# Patient Record
Sex: MALE | Race: WHITE | NOT HISPANIC OR LATINO
[De-identification: names, ages, dates, MRNs, and addresses within clinical notes are randomized per-mention and may not be internally consistent; named-entity substitution may affect disease eponyms.]

---

## 2017-06-17 PROBLEM — Z00.00 ENCOUNTER FOR PREVENTIVE HEALTH EXAMINATION: Status: ACTIVE | Noted: 2017-06-17

## 2017-06-28 ENCOUNTER — APPOINTMENT (OUTPATIENT)
Dept: UROLOGY | Facility: CLINIC | Age: 57
End: 2017-06-28

## 2017-06-28 VITALS
DIASTOLIC BLOOD PRESSURE: 82 MMHG | HEART RATE: 67 BPM | BODY MASS INDEX: 22.76 KG/M2 | RESPIRATION RATE: 17 BRPM | WEIGHT: 159 LBS | HEIGHT: 70 IN | SYSTOLIC BLOOD PRESSURE: 135 MMHG | TEMPERATURE: 97.7 F

## 2017-06-29 LAB
APPEARANCE: CLEAR
BACTERIA: NEGATIVE
BILIRUBIN URINE: NEGATIVE
BLOOD URINE: ABNORMAL
COLOR: YELLOW
GLUCOSE QUALITATIVE U: NORMAL MG/DL
HYALINE CASTS: 2 /LPF
KETONES URINE: NEGATIVE
LEUKOCYTE ESTERASE URINE: NEGATIVE
MICROSCOPIC-UA: NORMAL
NITRITE URINE: NEGATIVE
PH URINE: 6
PROTEIN URINE: NEGATIVE MG/DL
RED BLOOD CELLS URINE: 2 /HPF
SPECIFIC GRAVITY URINE: 1.01
SQUAMOUS EPITHELIAL CELLS: 0 /HPF
UROBILINOGEN URINE: NORMAL MG/DL
WHITE BLOOD CELLS URINE: 1 /HPF

## 2017-07-06 ENCOUNTER — TRANSCRIPTION ENCOUNTER (OUTPATIENT)
Age: 57
End: 2017-07-06

## 2017-07-10 LAB — BACTERIA UR CULT: NORMAL

## 2017-08-01 ENCOUNTER — OUTPATIENT (OUTPATIENT)
Dept: OUTPATIENT SERVICES | Facility: HOSPITAL | Age: 57
LOS: 1 days | End: 2017-08-01
Payer: COMMERCIAL

## 2017-08-01 ENCOUNTER — MESSAGE (OUTPATIENT)
Age: 57
End: 2017-08-01

## 2017-08-01 VITALS
WEIGHT: 158.73 LBS | HEART RATE: 66 BPM | SYSTOLIC BLOOD PRESSURE: 120 MMHG | RESPIRATION RATE: 16 BRPM | HEIGHT: 71 IN | DIASTOLIC BLOOD PRESSURE: 667 MMHG | OXYGEN SATURATION: 99 % | TEMPERATURE: 98 F

## 2017-08-01 DIAGNOSIS — Z98.890 OTHER SPECIFIED POSTPROCEDURAL STATES: Chronic | ICD-10-CM

## 2017-08-01 DIAGNOSIS — N20.0 CALCULUS OF KIDNEY: ICD-10-CM

## 2017-08-01 DIAGNOSIS — Z01.818 ENCOUNTER FOR OTHER PREPROCEDURAL EXAMINATION: ICD-10-CM

## 2017-08-01 LAB
ANION GAP SERPL CALC-SCNC: 14 MMOL/L — SIGNIFICANT CHANGE UP (ref 5–17)
BLD GP AB SCN SERPL QL: NEGATIVE — SIGNIFICANT CHANGE UP
BUN SERPL-MCNC: 21 MG/DL — SIGNIFICANT CHANGE UP (ref 7–23)
CALCIUM SERPL-MCNC: 9.6 MG/DL — SIGNIFICANT CHANGE UP (ref 8.4–10.5)
CHLORIDE SERPL-SCNC: 104 MMOL/L — SIGNIFICANT CHANGE UP (ref 96–108)
CO2 SERPL-SCNC: 23 MMOL/L — SIGNIFICANT CHANGE UP (ref 22–31)
CREAT SERPL-MCNC: 1.09 MG/DL — SIGNIFICANT CHANGE UP (ref 0.5–1.3)
GLUCOSE SERPL-MCNC: 108 MG/DL — HIGH (ref 70–99)
HCT VFR BLD CALC: 44.4 % — SIGNIFICANT CHANGE UP (ref 39–50)
HGB BLD-MCNC: 15.1 G/DL — SIGNIFICANT CHANGE UP (ref 13–17)
MCHC RBC-ENTMCNC: 29 PG — SIGNIFICANT CHANGE UP (ref 27–34)
MCHC RBC-ENTMCNC: 34 GM/DL — SIGNIFICANT CHANGE UP (ref 32–36)
MCV RBC AUTO: 85.4 FL — SIGNIFICANT CHANGE UP (ref 80–100)
PLATELET # BLD AUTO: 200 K/UL — SIGNIFICANT CHANGE UP (ref 150–400)
POTASSIUM SERPL-MCNC: 3.7 MMOL/L — SIGNIFICANT CHANGE UP (ref 3.5–5.3)
POTASSIUM SERPL-SCNC: 3.7 MMOL/L — SIGNIFICANT CHANGE UP (ref 3.5–5.3)
RBC # BLD: 5.2 M/UL — SIGNIFICANT CHANGE UP (ref 4.2–5.8)
RBC # FLD: 13.1 % — SIGNIFICANT CHANGE UP (ref 10.3–14.5)
RH IG SCN BLD-IMP: POSITIVE — SIGNIFICANT CHANGE UP
SODIUM SERPL-SCNC: 141 MMOL/L — SIGNIFICANT CHANGE UP (ref 135–145)
WBC # BLD: 5.16 K/UL — SIGNIFICANT CHANGE UP (ref 3.8–10.5)
WBC # FLD AUTO: 5.16 K/UL — SIGNIFICANT CHANGE UP (ref 3.8–10.5)

## 2017-08-01 RX ORDER — SODIUM CHLORIDE 9 MG/ML
3 INJECTION INTRAMUSCULAR; INTRAVENOUS; SUBCUTANEOUS EVERY 8 HOURS
Qty: 0 | Refills: 0 | Status: DISCONTINUED | OUTPATIENT
Start: 2017-08-10 | End: 2017-08-11

## 2017-08-01 RX ORDER — CEFAZOLIN SODIUM 1 G
2000 VIAL (EA) INJECTION ONCE
Qty: 0 | Refills: 0 | Status: DISCONTINUED | OUTPATIENT
Start: 2017-08-10 | End: 2017-08-11

## 2017-08-01 RX ORDER — LIDOCAINE HCL 20 MG/ML
0.2 VIAL (ML) INJECTION ONCE
Qty: 0 | Refills: 0 | Status: DISCONTINUED | OUTPATIENT
Start: 2017-08-10 | End: 2017-08-11

## 2017-08-01 NOTE — H&P PST ADULT - NSANTHOSAYNRD_GEN_A_CORE
No. MAYUR screening performed.  STOP BANG Legend: 0-2 = LOW Risk; 3-4 = INTERMEDIATE Risk; 5-8 = HIGH Risk

## 2017-08-01 NOTE — H&P PST ADULT - PMH
BPH (benign prostatic hyperplasia)    MVP (mitral valve prolapse)  "mild leaky valve i was told"  Renal stones  2015 & now

## 2017-08-01 NOTE — H&P PST ADULT - HISTORY OF PRESENT ILLNESS
56 year old male retired [place]  with h/o renal calculi couple of year ago, reports having" microscopic hematuria due to right kidney stone", scheduled for surgery.

## 2017-08-02 LAB
CULTURE RESULTS: NO GROWTH — SIGNIFICANT CHANGE UP
SPECIMEN SOURCE: SIGNIFICANT CHANGE UP

## 2017-08-10 ENCOUNTER — TRANSCRIPTION ENCOUNTER (OUTPATIENT)
Age: 57
End: 2017-08-10

## 2017-08-10 ENCOUNTER — RESULT REVIEW (OUTPATIENT)
Age: 57
End: 2017-08-10

## 2017-08-10 ENCOUNTER — INPATIENT (INPATIENT)
Facility: HOSPITAL | Age: 57
LOS: 0 days | Discharge: HOME | DRG: 660 | End: 2017-08-11
Attending: UROLOGY | Admitting: UROLOGY
Payer: COMMERCIAL

## 2017-08-10 ENCOUNTER — MESSAGE (OUTPATIENT)
Age: 57
End: 2017-08-10

## 2017-08-10 ENCOUNTER — APPOINTMENT (OUTPATIENT)
Dept: UROLOGY | Facility: HOSPITAL | Age: 57
End: 2017-08-10

## 2017-08-10 VITALS
HEART RATE: 81 BPM | DIASTOLIC BLOOD PRESSURE: 88 MMHG | SYSTOLIC BLOOD PRESSURE: 136 MMHG | HEIGHT: 71 IN | WEIGHT: 160.06 LBS | RESPIRATION RATE: 16 BRPM | TEMPERATURE: 98 F | OXYGEN SATURATION: 100 %

## 2017-08-10 DIAGNOSIS — Z01.818 ENCOUNTER FOR OTHER PREPROCEDURAL EXAMINATION: ICD-10-CM

## 2017-08-10 DIAGNOSIS — N20.0 CALCULUS OF KIDNEY: ICD-10-CM

## 2017-08-10 DIAGNOSIS — Z98.890 OTHER SPECIFIED POSTPROCEDURAL STATES: Chronic | ICD-10-CM

## 2017-08-10 LAB
ANION GAP SERPL CALC-SCNC: 15 MMOL/L — SIGNIFICANT CHANGE UP (ref 5–17)
BUN SERPL-MCNC: 19 MG/DL — SIGNIFICANT CHANGE UP (ref 7–23)
CALCIUM SERPL-MCNC: 8.6 MG/DL — SIGNIFICANT CHANGE UP (ref 8.4–10.5)
CHLORIDE SERPL-SCNC: 102 MMOL/L — SIGNIFICANT CHANGE UP (ref 96–108)
CO2 SERPL-SCNC: 25 MMOL/L — SIGNIFICANT CHANGE UP (ref 22–31)
CREAT SERPL-MCNC: 1.13 MG/DL — SIGNIFICANT CHANGE UP (ref 0.5–1.3)
GLUCOSE SERPL-MCNC: 105 MG/DL — HIGH (ref 70–99)
HCT VFR BLD CALC: 44.8 % — SIGNIFICANT CHANGE UP (ref 39–50)
HGB BLD-MCNC: 14.9 G/DL — SIGNIFICANT CHANGE UP (ref 13–17)
MCHC RBC-ENTMCNC: 29.8 PG — SIGNIFICANT CHANGE UP (ref 27–34)
MCHC RBC-ENTMCNC: 33.3 GM/DL — SIGNIFICANT CHANGE UP (ref 32–36)
MCV RBC AUTO: 89.5 FL — SIGNIFICANT CHANGE UP (ref 80–100)
PLATELET # BLD AUTO: 209 K/UL — SIGNIFICANT CHANGE UP (ref 150–400)
POTASSIUM SERPL-MCNC: 4 MMOL/L — SIGNIFICANT CHANGE UP (ref 3.5–5.3)
POTASSIUM SERPL-SCNC: 4 MMOL/L — SIGNIFICANT CHANGE UP (ref 3.5–5.3)
RBC # BLD: 5.01 M/UL — SIGNIFICANT CHANGE UP (ref 4.2–5.8)
RBC # FLD: 11.9 % — SIGNIFICANT CHANGE UP (ref 10.3–14.5)
SODIUM SERPL-SCNC: 142 MMOL/L — SIGNIFICANT CHANGE UP (ref 135–145)
WBC # BLD: 13.5 K/UL — HIGH (ref 3.8–10.5)
WBC # FLD AUTO: 13.5 K/UL — HIGH (ref 3.8–10.5)

## 2017-08-10 PROCEDURE — 85027 COMPLETE CBC AUTOMATED: CPT

## 2017-08-10 PROCEDURE — 86901 BLOOD TYPING SEROLOGIC RH(D): CPT

## 2017-08-10 PROCEDURE — 86900 BLOOD TYPING SEROLOGIC ABO: CPT

## 2017-08-10 PROCEDURE — 80048 BASIC METABOLIC PNL TOTAL CA: CPT

## 2017-08-10 PROCEDURE — 86850 RBC ANTIBODY SCREEN: CPT

## 2017-08-10 PROCEDURE — G0463: CPT

## 2017-08-10 PROCEDURE — 87086 URINE CULTURE/COLONY COUNT: CPT

## 2017-08-10 PROCEDURE — 88300 SURGICAL PATH GROSS: CPT | Mod: 26

## 2017-08-10 PROCEDURE — 76000 FLUOROSCOPY <1 HR PHYS/QHP: CPT

## 2017-08-10 RX ORDER — OXYCODONE AND ACETAMINOPHEN 5; 325 MG/1; MG/1
2 TABLET ORAL EVERY 6 HOURS
Qty: 0 | Refills: 0 | Status: DISCONTINUED | OUTPATIENT
Start: 2017-08-10 | End: 2017-08-11

## 2017-08-10 RX ORDER — OXYCODONE AND ACETAMINOPHEN 5; 325 MG/1; MG/1
1 TABLET ORAL EVERY 4 HOURS
Qty: 0 | Refills: 0 | Status: DISCONTINUED | OUTPATIENT
Start: 2017-08-10 | End: 2017-08-11

## 2017-08-10 RX ORDER — HEPARIN SODIUM 5000 [USP'U]/ML
5000 INJECTION INTRAVENOUS; SUBCUTANEOUS EVERY 8 HOURS
Qty: 0 | Refills: 0 | Status: DISCONTINUED | OUTPATIENT
Start: 2017-08-10 | End: 2017-08-11

## 2017-08-10 RX ORDER — ONDANSETRON 8 MG/1
4 TABLET, FILM COATED ORAL ONCE
Qty: 0 | Refills: 0 | Status: DISCONTINUED | OUTPATIENT
Start: 2017-08-10 | End: 2017-08-11

## 2017-08-10 RX ORDER — ACETAMINOPHEN 500 MG
650 TABLET ORAL EVERY 6 HOURS
Qty: 0 | Refills: 0 | Status: DISCONTINUED | OUTPATIENT
Start: 2017-08-10 | End: 2017-08-11

## 2017-08-10 RX ORDER — HYDROMORPHONE HYDROCHLORIDE 2 MG/ML
0.5 INJECTION INTRAMUSCULAR; INTRAVENOUS; SUBCUTANEOUS
Qty: 0 | Refills: 0 | Status: DISCONTINUED | OUTPATIENT
Start: 2017-08-10 | End: 2017-08-11

## 2017-08-10 RX ORDER — SODIUM CHLORIDE 9 MG/ML
1000 INJECTION, SOLUTION INTRAVENOUS
Qty: 0 | Refills: 0 | Status: DISCONTINUED | OUTPATIENT
Start: 2017-08-10 | End: 2017-08-11

## 2017-08-10 RX ADMIN — HYDROMORPHONE HYDROCHLORIDE 0.5 MILLIGRAM(S): 2 INJECTION INTRAMUSCULAR; INTRAVENOUS; SUBCUTANEOUS at 23:20

## 2017-08-10 RX ADMIN — HEPARIN SODIUM 5000 UNIT(S): 5000 INJECTION INTRAVENOUS; SUBCUTANEOUS at 22:47

## 2017-08-10 RX ADMIN — HYDROMORPHONE HYDROCHLORIDE 0.5 MILLIGRAM(S): 2 INJECTION INTRAMUSCULAR; INTRAVENOUS; SUBCUTANEOUS at 23:30

## 2017-08-10 RX ADMIN — SODIUM CHLORIDE 3 MILLILITER(S): 9 INJECTION INTRAMUSCULAR; INTRAVENOUS; SUBCUTANEOUS at 22:47

## 2017-08-11 ENCOUNTER — TRANSCRIPTION ENCOUNTER (OUTPATIENT)
Age: 57
End: 2017-08-11

## 2017-08-11 VITALS
HEART RATE: 74 BPM | SYSTOLIC BLOOD PRESSURE: 128 MMHG | OXYGEN SATURATION: 94 % | RESPIRATION RATE: 16 BRPM | DIASTOLIC BLOOD PRESSURE: 71 MMHG | TEMPERATURE: 99 F

## 2017-08-11 DIAGNOSIS — N20.0 CALCULUS OF KIDNEY: ICD-10-CM

## 2017-08-11 LAB
ANION GAP SERPL CALC-SCNC: 17 MMOL/L — SIGNIFICANT CHANGE UP (ref 5–17)
BASOPHILS # BLD AUTO: 0 K/UL — SIGNIFICANT CHANGE UP (ref 0–0.2)
BASOPHILS NFR BLD AUTO: 0 % — SIGNIFICANT CHANGE UP (ref 0–2)
BUN SERPL-MCNC: 21 MG/DL — SIGNIFICANT CHANGE UP (ref 7–23)
CALCIUM SERPL-MCNC: 8.3 MG/DL — LOW (ref 8.4–10.5)
CHLORIDE SERPL-SCNC: 102 MMOL/L — SIGNIFICANT CHANGE UP (ref 96–108)
CO2 SERPL-SCNC: 22 MMOL/L — SIGNIFICANT CHANGE UP (ref 22–31)
CREAT SERPL-MCNC: 1.14 MG/DL — SIGNIFICANT CHANGE UP (ref 0.5–1.3)
EOSINOPHIL # BLD AUTO: 0 K/UL — SIGNIFICANT CHANGE UP (ref 0–0.5)
EOSINOPHIL NFR BLD AUTO: 0.2 % — SIGNIFICANT CHANGE UP (ref 0–6)
GLUCOSE SERPL-MCNC: 133 MG/DL — HIGH (ref 70–99)
HCT VFR BLD CALC: 41.8 % — SIGNIFICANT CHANGE UP (ref 39–50)
HGB BLD-MCNC: 14.1 G/DL — SIGNIFICANT CHANGE UP (ref 13–17)
LYMPHOCYTES # BLD AUTO: 0.6 K/UL — LOW (ref 1–3.3)
LYMPHOCYTES # BLD AUTO: 4.3 % — LOW (ref 13–44)
MCHC RBC-ENTMCNC: 30.1 PG — SIGNIFICANT CHANGE UP (ref 27–34)
MCHC RBC-ENTMCNC: 33.7 GM/DL — SIGNIFICANT CHANGE UP (ref 32–36)
MCV RBC AUTO: 89.3 FL — SIGNIFICANT CHANGE UP (ref 80–100)
MONOCYTES # BLD AUTO: 0.2 K/UL — SIGNIFICANT CHANGE UP (ref 0–0.9)
MONOCYTES NFR BLD AUTO: 1.4 % — LOW (ref 2–14)
NEUTROPHILS # BLD AUTO: 14.2 K/UL — HIGH (ref 1.8–7.4)
NEUTROPHILS NFR BLD AUTO: 94.2 % — HIGH (ref 43–77)
PLATELET # BLD AUTO: 182 K/UL — SIGNIFICANT CHANGE UP (ref 150–400)
POTASSIUM SERPL-MCNC: 4.2 MMOL/L — SIGNIFICANT CHANGE UP (ref 3.5–5.3)
POTASSIUM SERPL-SCNC: 4.2 MMOL/L — SIGNIFICANT CHANGE UP (ref 3.5–5.3)
RBC # BLD: 4.69 M/UL — SIGNIFICANT CHANGE UP (ref 4.2–5.8)
RBC # FLD: 11.9 % — SIGNIFICANT CHANGE UP (ref 10.3–14.5)
SODIUM SERPL-SCNC: 141 MMOL/L — SIGNIFICANT CHANGE UP (ref 135–145)
WBC # BLD: 15.1 K/UL — HIGH (ref 3.8–10.5)
WBC # FLD AUTO: 15.1 K/UL — HIGH (ref 3.8–10.5)

## 2017-08-11 PROCEDURE — 87086 URINE CULTURE/COLONY COUNT: CPT

## 2017-08-11 PROCEDURE — C1726: CPT

## 2017-08-11 PROCEDURE — C1729: CPT

## 2017-08-11 PROCEDURE — 80048 BASIC METABOLIC PNL TOTAL CA: CPT

## 2017-08-11 PROCEDURE — 82365 CALCULUS SPECTROSCOPY: CPT

## 2017-08-11 PROCEDURE — 74176 CT ABD & PELVIS W/O CONTRAST: CPT | Mod: 26

## 2017-08-11 PROCEDURE — 74176 CT ABD & PELVIS W/O CONTRAST: CPT

## 2017-08-11 PROCEDURE — 87070 CULTURE OTHR SPECIMN AEROBIC: CPT

## 2017-08-11 PROCEDURE — C1769: CPT

## 2017-08-11 PROCEDURE — 85027 COMPLETE CBC AUTOMATED: CPT

## 2017-08-11 PROCEDURE — 88300 SURGICAL PATH GROSS: CPT

## 2017-08-11 RX ORDER — OXYCODONE HYDROCHLORIDE 5 MG/1
1 TABLET ORAL
Qty: 18 | Refills: 0 | OUTPATIENT
Start: 2017-08-11 | End: 2017-08-14

## 2017-08-11 RX ORDER — SODIUM CHLORIDE 9 MG/ML
1000 INJECTION INTRAMUSCULAR; INTRAVENOUS; SUBCUTANEOUS ONCE
Qty: 0 | Refills: 0 | Status: COMPLETED | OUTPATIENT
Start: 2017-08-11 | End: 2017-08-11

## 2017-08-11 RX ORDER — OXYCODONE HYDROCHLORIDE 5 MG/1
10 TABLET ORAL ONCE
Qty: 0 | Refills: 0 | Status: DISCONTINUED | OUTPATIENT
Start: 2017-08-11 | End: 2017-08-11

## 2017-08-11 RX ORDER — OXYCODONE HYDROCHLORIDE 5 MG/1
1 TABLET ORAL
Qty: 28 | Refills: 0 | OUTPATIENT
Start: 2017-08-11 | End: 2017-08-18

## 2017-08-11 RX ORDER — CEFAZOLIN SODIUM 1 G
1000 VIAL (EA) INJECTION EVERY 8 HOURS
Qty: 0 | Refills: 0 | Status: DISCONTINUED | OUTPATIENT
Start: 2017-08-11 | End: 2017-08-11

## 2017-08-11 RX ADMIN — HYDROMORPHONE HYDROCHLORIDE 0.5 MILLIGRAM(S): 2 INJECTION INTRAMUSCULAR; INTRAVENOUS; SUBCUTANEOUS at 05:44

## 2017-08-11 RX ADMIN — OXYCODONE AND ACETAMINOPHEN 2 TABLET(S): 5; 325 TABLET ORAL at 09:16

## 2017-08-11 RX ADMIN — Medication 100 MILLIGRAM(S): at 17:22

## 2017-08-11 RX ADMIN — Medication 650 MILLIGRAM(S): at 14:40

## 2017-08-11 RX ADMIN — HYDROMORPHONE HYDROCHLORIDE 0.5 MILLIGRAM(S): 2 INJECTION INTRAMUSCULAR; INTRAVENOUS; SUBCUTANEOUS at 02:05

## 2017-08-11 RX ADMIN — Medication 650 MILLIGRAM(S): at 13:44

## 2017-08-11 RX ADMIN — OXYCODONE HYDROCHLORIDE 10 MILLIGRAM(S): 5 TABLET ORAL at 16:50

## 2017-08-11 RX ADMIN — HYDROMORPHONE HYDROCHLORIDE 0.5 MILLIGRAM(S): 2 INJECTION INTRAMUSCULAR; INTRAVENOUS; SUBCUTANEOUS at 06:00

## 2017-08-11 RX ADMIN — SODIUM CHLORIDE 1000 MILLILITER(S): 9 INJECTION INTRAMUSCULAR; INTRAVENOUS; SUBCUTANEOUS at 02:34

## 2017-08-11 RX ADMIN — OXYCODONE HYDROCHLORIDE 10 MILLIGRAM(S): 5 TABLET ORAL at 15:54

## 2017-08-11 RX ADMIN — Medication 100 MILLIGRAM(S): at 07:56

## 2017-08-11 RX ADMIN — HEPARIN SODIUM 5000 UNIT(S): 5000 INJECTION INTRAVENOUS; SUBCUTANEOUS at 05:14

## 2017-08-11 RX ADMIN — OXYCODONE AND ACETAMINOPHEN 2 TABLET(S): 5; 325 TABLET ORAL at 10:10

## 2017-08-11 RX ADMIN — OXYCODONE AND ACETAMINOPHEN 2 TABLET(S): 5; 325 TABLET ORAL at 19:39

## 2017-08-11 RX ADMIN — HYDROMORPHONE HYDROCHLORIDE 0.5 MILLIGRAM(S): 2 INJECTION INTRAMUSCULAR; INTRAVENOUS; SUBCUTANEOUS at 02:20

## 2017-08-11 RX ADMIN — SODIUM CHLORIDE 3 MILLILITER(S): 9 INJECTION INTRAMUSCULAR; INTRAVENOUS; SUBCUTANEOUS at 13:40

## 2017-08-11 RX ADMIN — HEPARIN SODIUM 5000 UNIT(S): 5000 INJECTION INTRAVENOUS; SUBCUTANEOUS at 13:44

## 2017-08-11 RX ADMIN — SODIUM CHLORIDE 125 MILLILITER(S): 9 INJECTION, SOLUTION INTRAVENOUS at 07:33

## 2017-08-11 RX ADMIN — SODIUM CHLORIDE 3 MILLILITER(S): 9 INJECTION INTRAMUSCULAR; INTRAVENOUS; SUBCUTANEOUS at 05:13

## 2017-08-11 NOTE — PROGRESS NOTE ADULT - SUBJECTIVE AND OBJECTIVE BOX
Post op Check    Pt seen and examined without complaints. Pain is controlled. Denies SOB/CP/N/V.     Vital Signs Last 24 Hrs  T(C): 36.2 (10 Aug 2017 23:15), Max: 36.8 (10 Aug 2017 15:01)  T(F): 97.2 (10 Aug 2017 23:15), Max: 98.2 (10 Aug 2017 15:01)  HR: 57 (11 Aug 2017 00:00) (54 - 81)  BP: 121/64 (11 Aug 2017 00:00) (119/63 - 149/79)  BP(mean): 87 (11 Aug 2017 00:00) (85 - 93)  RR: 15 (11 Aug 2017 00:00) (14 - 19)  SpO2: 98% (11 Aug 2017 00:00) (97% - 100%)    I&O's Summary    10 Aug 2017 07:01  -  11 Aug 2017 01:57  --------------------------------------------------------  IN: 375 mL / OUT: 400 mL / NET: -25 mL        Physical Exam  Gen: NAD  Abd: Soft, NT, ND  Back: R PCN in place, dressing reinforced, tube clamped  : henley in place, urine hematuric                          14.9   13.5  )-----------( 209      ( 10 Aug 2017 22:49 )             44.8       08-10    142  |  102  |  19  ----------------------------<  105<H>  4.0   |  25  |  1.13    Ca    8.6      10 Aug 2017 22:49        A/P: 56y Male s/p R PCNL    -DVT prophylaxis/OOB  -Incentive spirometry  -Strict I&O's  -Analgesia and antiemetics as needed  -Diet  -AM labs  Post op Check    Pt seen and examined without complaints. Pain is controlled. Denies SOB/CP/N/V.     Vital Signs Last 24 Hrs  T(C): 36.2 (10 Aug 2017 23:15), Max: 36.8 (10 Aug 2017 15:01)  T(F): 97.2 (10 Aug 2017 23:15), Max: 98.2 (10 Aug 2017 15:01)  HR: 57 (11 Aug 2017 00:00) (54 - 81)  BP: 121/64 (11 Aug 2017 00:00) (119/63 - 149/79)  BP(mean): 87 (11 Aug 2017 00:00) (85 - 93)  RR: 15 (11 Aug 2017 00:00) (14 - 19)  SpO2: 98% (11 Aug 2017 00:00) (97% - 100%)    I&O's Summary    10 Aug 2017 07:01  -  11 Aug 2017 01:57  --------------------------------------------------------  IN: 375 mL / OUT: 400 mL / NET: -25 mL    U.O. 40cc over last 2 hours        Physical Exam  Gen: NAD  Abd: Soft, NT, ND  Back: R PCN in place, dressing reinforced, tube clamped  : henley in place, urine hematuric                          14.9   13.5  )-----------( 209      ( 10 Aug 2017 22:49 )             44.8       08-10    142  |  102  |  19  ----------------------------<  105<H>  4.0   |  25  |  1.13    Ca    8.6      10 Aug 2017 22:49        A/P: 56y Male s/p R PCNL    -IV bolus  -monitor urine output  -DVT prophylaxis/OOB  -Incentive spirometry  -Analgesia and antiemetics as needed  -Diet  -AM labs

## 2017-08-11 NOTE — DISCHARGE NOTE ADULT - PATIENT PORTAL LINK FT
“You can access the FollowHealth Patient Portal, offered by [facility], by registering with the following website: [email or web address]”

## 2017-08-11 NOTE — PROGRESS NOTE ADULT - PROBLEM SELECTOR PLAN 1
-PVR  -CT scan to assess stone burden  -regular diet, GI function  -OOB, ambulate, DVT ppx  -incentive spirometry  -possible coupe loop NT removal

## 2017-08-11 NOTE — DISCHARGE NOTE ADULT - HOSPITAL COURSE
This is a 56 year old male who underwent a right percutaneous nephrolithotomy on 8/10.  Post operatively, he was pain controlled, tolerating regular diet, and having GI function.  CT scan showed no residual stones, so the nephrostomy tube was removed.  He was discharged home with appropriate prescriptions and instructions for follow up.

## 2017-08-11 NOTE — DISCHARGE NOTE ADULT - CONDITIONS AT DISCHARGE
Patient a&xo4. Patient VSS. Patient safety maintained. Patient IV removed with no signs/symptoms of redness/swelling. Patient a&xo4. Patient VSS. Patient safety maintained. Patient IV removed with no signs/symptoms of redness/swelling. Pt placed with right flank drainage pouch for prior nephro site drain puncture site.

## 2017-08-11 NOTE — DISCHARGE NOTE ADULT - MEDICATION SUMMARY - MEDICATIONS TO TAKE
I will START or STAY ON the medications listed below when I get home from the hospital:    acetaminophen-oxycodone 325 mg-5 mg oral tablet  -- 1 tab(s) by mouth every 4 hours, As needed, Mild Pain MDD:6  -- Indication: For Renal stones

## 2017-08-11 NOTE — PROGRESS NOTE ADULT - SUBJECTIVE AND OBJECTIVE BOX
The patient was seen and examined at bedside.  Denies complaints of chest pain, shortness of breath, nausea, acute pain.  Right nephrostomy tube uncapped.    T(C): 37.1 (08-11-17 @ 04:00), Max: 37.1 (08-11-17 @ 04:00)  HR: 59 (08-11-17 @ 06:00) (54 - 81)  BP: 115/68 (08-11-17 @ 06:00) (110/64 - 149/79)  RR: 15 (08-11-17 @ 06:00) (14 - 19)  SpO2: 98% (08-11-17 @ 06:00) (97% - 100%)  Wt(kg): --    Physical Exam:    General: NAD, A+Ox3  Abdomen: soft, non-tender, non-distended  Back: dressing clean/dry/intact      08-10 @ 07:01  -  08-11 @ 07:00  --------------------------------------------------------  IN: 2000 mL / OUT: 885 mL / NET: 1115 mL    08-11 @ 07:01  -  08-11 @ 07:26  --------------------------------------------------------  IN: 125 mL / OUT: 0 mL / NET: 125 mL

## 2017-08-11 NOTE — DISCHARGE NOTE ADULT - CARE PROVIDER_API CALL
Tamera Stafford), Urology  [address] [place] [address]  Phone: [phone]  Fax: [phone]

## 2017-08-11 NOTE — DISCHARGE NOTE ADULT - CARE PLAN
Principal Discharge DX:	Renal stones  Goal:	s/p right percutaneous nephrolithotomy  Instructions for follow-up, activity and diet:	Regular diet.  Take pain medications as needed.  Please follow up with Dr Stafford [phone].  call for appointment. Principal Discharge DX:	Renal stones  Goal:	s/p right percutaneous nephrolithotomy  Instructions for follow-up, activity and diet:	Regular diet.  Take pain medications as needed.  Please follow up with Dr Stafford [phone].  call for appointment. Principal Discharge DX:	Renal stones  Goal:	s/p right percutaneous nephrolithotomy  Instructions for follow-up, activity and diet:	Regular diet.  Take pain medications as needed.  Please follow up with Dr Stafford [phone].  call for appointment.

## 2017-08-11 NOTE — DISCHARGE NOTE ADULT - INSTRUCTIONS
Regular diet. If unable to tolerate diet, nausea, vomiting, fever above 100.3, chills, or an increase in pain, notify provider or return to ER.

## 2017-08-12 ENCOUNTER — MESSAGE (OUTPATIENT)
Age: 57
End: 2017-08-12

## 2017-08-13 ENCOUNTER — MESSAGE (OUTPATIENT)
Age: 57
End: 2017-08-13

## 2017-08-13 ENCOUNTER — INPATIENT (INPATIENT)
Facility: HOSPITAL | Age: 57
LOS: 2 days | Discharge: HOME | DRG: 864 | End: 2017-08-16
Attending: UROLOGY | Admitting: UROLOGY
Payer: COMMERCIAL

## 2017-08-13 VITALS
DIASTOLIC BLOOD PRESSURE: 78 MMHG | TEMPERATURE: 100 F | HEART RATE: 85 BPM | OXYGEN SATURATION: 97 % | WEIGHT: 160.06 LBS | HEIGHT: 71 IN | SYSTOLIC BLOOD PRESSURE: 136 MMHG | RESPIRATION RATE: 16 BRPM

## 2017-08-13 DIAGNOSIS — R50.9 FEVER, UNSPECIFIED: ICD-10-CM

## 2017-08-13 DIAGNOSIS — N20.0 CALCULUS OF KIDNEY: ICD-10-CM

## 2017-08-13 DIAGNOSIS — Z98.890 OTHER SPECIFIED POSTPROCEDURAL STATES: Chronic | ICD-10-CM

## 2017-08-13 LAB
ALBUMIN SERPL ELPH-MCNC: 4.4 G/DL — SIGNIFICANT CHANGE UP (ref 3.3–5)
ALP SERPL-CCNC: 60 U/L — SIGNIFICANT CHANGE UP (ref 40–120)
ALT FLD-CCNC: 19 U/L RC — SIGNIFICANT CHANGE UP (ref 10–45)
ANION GAP SERPL CALC-SCNC: 13 MMOL/L — SIGNIFICANT CHANGE UP (ref 5–17)
APPEARANCE UR: CLEAR — SIGNIFICANT CHANGE UP
AST SERPL-CCNC: 18 U/L — SIGNIFICANT CHANGE UP (ref 10–40)
BASOPHILS # BLD AUTO: 0 K/UL — SIGNIFICANT CHANGE UP (ref 0–0.2)
BASOPHILS NFR BLD AUTO: 0.1 % — SIGNIFICANT CHANGE UP (ref 0–2)
BILIRUB SERPL-MCNC: 1.1 MG/DL — SIGNIFICANT CHANGE UP (ref 0.2–1.2)
BILIRUB UR-MCNC: NEGATIVE — SIGNIFICANT CHANGE UP
BUN SERPL-MCNC: 17 MG/DL — SIGNIFICANT CHANGE UP (ref 7–23)
CALCIUM SERPL-MCNC: 9.4 MG/DL — SIGNIFICANT CHANGE UP (ref 8.4–10.5)
CHLORIDE SERPL-SCNC: 96 MMOL/L — SIGNIFICANT CHANGE UP (ref 96–108)
CO2 SERPL-SCNC: 27 MMOL/L — SIGNIFICANT CHANGE UP (ref 22–31)
COLOR SPEC: YELLOW — SIGNIFICANT CHANGE UP
CREAT SERPL-MCNC: 1.4 MG/DL — HIGH (ref 0.5–1.3)
CULTURE RESULTS: SIGNIFICANT CHANGE UP
CULTURE RESULTS: SIGNIFICANT CHANGE UP
DIFF PNL FLD: ABNORMAL
EOSINOPHIL # BLD AUTO: 0 K/UL — SIGNIFICANT CHANGE UP (ref 0–0.5)
EOSINOPHIL NFR BLD AUTO: 0.1 % — SIGNIFICANT CHANGE UP (ref 0–6)
GAS PNL BLDV: SIGNIFICANT CHANGE UP
GLUCOSE SERPL-MCNC: 119 MG/DL — HIGH (ref 70–99)
GLUCOSE UR QL: NEGATIVE — SIGNIFICANT CHANGE UP
HCT VFR BLD CALC: 38.4 % — LOW (ref 39–50)
HGB BLD-MCNC: 13 G/DL — SIGNIFICANT CHANGE UP (ref 13–17)
KETONES UR-MCNC: NEGATIVE — SIGNIFICANT CHANGE UP
LEUKOCYTE ESTERASE UR-ACNC: NEGATIVE — SIGNIFICANT CHANGE UP
LYMPHOCYTES # BLD AUTO: 0.8 K/UL — LOW (ref 1–3.3)
LYMPHOCYTES # BLD AUTO: 5.4 % — LOW (ref 13–44)
MCHC RBC-ENTMCNC: 30.9 PG — SIGNIFICANT CHANGE UP (ref 27–34)
MCHC RBC-ENTMCNC: 33.9 GM/DL — SIGNIFICANT CHANGE UP (ref 32–36)
MCV RBC AUTO: 90.9 FL — SIGNIFICANT CHANGE UP (ref 80–100)
MONOCYTES # BLD AUTO: 1.2 K/UL — HIGH (ref 0–0.9)
MONOCYTES NFR BLD AUTO: 8.5 % — SIGNIFICANT CHANGE UP (ref 2–14)
NEUTROPHILS # BLD AUTO: 12.1 K/UL — HIGH (ref 1.8–7.4)
NEUTROPHILS NFR BLD AUTO: 85.7 % — HIGH (ref 43–77)
NITRITE UR-MCNC: NEGATIVE — SIGNIFICANT CHANGE UP
PH UR: 6 — SIGNIFICANT CHANGE UP (ref 5–8)
PLATELET # BLD AUTO: 166 K/UL — SIGNIFICANT CHANGE UP (ref 150–400)
POTASSIUM SERPL-MCNC: 4 MMOL/L — SIGNIFICANT CHANGE UP (ref 3.5–5.3)
POTASSIUM SERPL-SCNC: 4 MMOL/L — SIGNIFICANT CHANGE UP (ref 3.5–5.3)
PROT SERPL-MCNC: 7.4 G/DL — SIGNIFICANT CHANGE UP (ref 6–8.3)
PROT UR-MCNC: 30 MG/DL
RBC # BLD: 4.23 M/UL — SIGNIFICANT CHANGE UP (ref 4.2–5.8)
RBC # FLD: 11.8 % — SIGNIFICANT CHANGE UP (ref 10.3–14.5)
SODIUM SERPL-SCNC: 136 MMOL/L — SIGNIFICANT CHANGE UP (ref 135–145)
SP GR SPEC: 1.02 — SIGNIFICANT CHANGE UP (ref 1.01–1.02)
SPECIMEN SOURCE: SIGNIFICANT CHANGE UP
SPECIMEN SOURCE: SIGNIFICANT CHANGE UP
UROBILINOGEN FLD QL: NEGATIVE — SIGNIFICANT CHANGE UP
WBC # BLD: 14.1 K/UL — HIGH (ref 3.8–10.5)
WBC # FLD AUTO: 14.1 K/UL — HIGH (ref 3.8–10.5)

## 2017-08-13 PROCEDURE — 99285 EMERGENCY DEPT VISIT HI MDM: CPT

## 2017-08-13 PROCEDURE — 74177 CT ABD & PELVIS W/CONTRAST: CPT | Mod: 26

## 2017-08-13 RX ORDER — ONDANSETRON 8 MG/1
4 TABLET, FILM COATED ORAL ONCE
Qty: 0 | Refills: 0 | Status: COMPLETED | OUTPATIENT
Start: 2017-08-13 | End: 2017-08-13

## 2017-08-13 RX ORDER — OXYCODONE AND ACETAMINOPHEN 5; 325 MG/1; MG/1
1 TABLET ORAL EVERY 4 HOURS
Qty: 0 | Refills: 0 | Status: DISCONTINUED | OUTPATIENT
Start: 2017-08-13 | End: 2017-08-16

## 2017-08-13 RX ORDER — CEFTRIAXONE 500 MG/1
1 INJECTION, POWDER, FOR SOLUTION INTRAMUSCULAR; INTRAVENOUS EVERY 24 HOURS
Qty: 0 | Refills: 0 | Status: DISCONTINUED | OUTPATIENT
Start: 2017-08-13 | End: 2017-08-16

## 2017-08-13 RX ORDER — MORPHINE SULFATE 50 MG/1
4 CAPSULE, EXTENDED RELEASE ORAL ONCE
Qty: 0 | Refills: 0 | Status: DISCONTINUED | OUTPATIENT
Start: 2017-08-13 | End: 2017-08-13

## 2017-08-13 RX ORDER — ONDANSETRON 8 MG/1
4 TABLET, FILM COATED ORAL EVERY 6 HOURS
Qty: 0 | Refills: 0 | Status: DISCONTINUED | OUTPATIENT
Start: 2017-08-13 | End: 2017-08-16

## 2017-08-13 RX ORDER — MORPHINE SULFATE 50 MG/1
2 CAPSULE, EXTENDED RELEASE ORAL EVERY 4 HOURS
Qty: 0 | Refills: 0 | Status: DISCONTINUED | OUTPATIENT
Start: 2017-08-13 | End: 2017-08-13

## 2017-08-13 RX ORDER — METOCLOPRAMIDE HCL 10 MG
10 TABLET ORAL EVERY 6 HOURS
Qty: 0 | Refills: 0 | Status: DISCONTINUED | OUTPATIENT
Start: 2017-08-13 | End: 2017-08-16

## 2017-08-13 RX ORDER — HEPARIN SODIUM 5000 [USP'U]/ML
5000 INJECTION INTRAVENOUS; SUBCUTANEOUS EVERY 12 HOURS
Qty: 0 | Refills: 0 | Status: DISCONTINUED | OUTPATIENT
Start: 2017-08-13 | End: 2017-08-16

## 2017-08-13 RX ORDER — SENNA PLUS 8.6 MG/1
2 TABLET ORAL AT BEDTIME
Qty: 0 | Refills: 0 | Status: DISCONTINUED | OUTPATIENT
Start: 2017-08-13 | End: 2017-08-14

## 2017-08-13 RX ORDER — ACETAMINOPHEN 500 MG
650 TABLET ORAL EVERY 6 HOURS
Qty: 0 | Refills: 0 | Status: DISCONTINUED | OUTPATIENT
Start: 2017-08-13 | End: 2017-08-16

## 2017-08-13 RX ORDER — SODIUM CHLORIDE 9 MG/ML
1000 INJECTION INTRAMUSCULAR; INTRAVENOUS; SUBCUTANEOUS ONCE
Qty: 0 | Refills: 0 | Status: COMPLETED | OUTPATIENT
Start: 2017-08-13 | End: 2017-08-13

## 2017-08-13 RX ORDER — DOCUSATE SODIUM 100 MG
100 CAPSULE ORAL THREE TIMES A DAY
Qty: 0 | Refills: 0 | Status: DISCONTINUED | OUTPATIENT
Start: 2017-08-13 | End: 2017-08-16

## 2017-08-13 RX ORDER — OXYCODONE AND ACETAMINOPHEN 5; 325 MG/1; MG/1
2 TABLET ORAL EVERY 6 HOURS
Qty: 0 | Refills: 0 | Status: DISCONTINUED | OUTPATIENT
Start: 2017-08-13 | End: 2017-08-16

## 2017-08-13 RX ORDER — ACETAMINOPHEN 500 MG
1000 TABLET ORAL ONCE
Qty: 0 | Refills: 0 | Status: COMPLETED | OUTPATIENT
Start: 2017-08-13 | End: 2017-08-13

## 2017-08-13 RX ORDER — SODIUM CHLORIDE 9 MG/ML
1000 INJECTION INTRAMUSCULAR; INTRAVENOUS; SUBCUTANEOUS
Qty: 0 | Refills: 0 | Status: DISCONTINUED | OUTPATIENT
Start: 2017-08-13 | End: 2017-08-14

## 2017-08-13 RX ADMIN — MORPHINE SULFATE 4 MILLIGRAM(S): 50 CAPSULE, EXTENDED RELEASE ORAL at 14:25

## 2017-08-13 RX ADMIN — Medication 100 MILLIGRAM(S): at 21:14

## 2017-08-13 RX ADMIN — OXYCODONE AND ACETAMINOPHEN 2 TABLET(S): 5; 325 TABLET ORAL at 21:15

## 2017-08-13 RX ADMIN — ONDANSETRON 4 MILLIGRAM(S): 8 TABLET, FILM COATED ORAL at 13:37

## 2017-08-13 RX ADMIN — MORPHINE SULFATE 4 MILLIGRAM(S): 50 CAPSULE, EXTENDED RELEASE ORAL at 13:37

## 2017-08-13 RX ADMIN — MORPHINE SULFATE 4 MILLIGRAM(S): 50 CAPSULE, EXTENDED RELEASE ORAL at 18:03

## 2017-08-13 RX ADMIN — SODIUM CHLORIDE 1000 MILLILITER(S): 9 INJECTION INTRAMUSCULAR; INTRAVENOUS; SUBCUTANEOUS at 13:00

## 2017-08-13 RX ADMIN — OXYCODONE AND ACETAMINOPHEN 2 TABLET(S): 5; 325 TABLET ORAL at 20:30

## 2017-08-13 RX ADMIN — Medication 400 MILLIGRAM(S): at 13:37

## 2017-08-13 RX ADMIN — CEFTRIAXONE 100 GRAM(S): 500 INJECTION, POWDER, FOR SOLUTION INTRAMUSCULAR; INTRAVENOUS at 15:04

## 2017-08-13 NOTE — H&P ADULT - PROBLEM SELECTOR PLAN 1
-Continue ceftriaxone  -IVF  -Antipyretics and analgesia prn   -F/u urine and blood cultures  -Trend wbc and creatinine

## 2017-08-13 NOTE — ED PROVIDER NOTE - ATTENDING CONTRIBUTION TO CARE
attending Cayetano: 56yM recent R percutaneous nephrostolithotomy for R kidney stone p/w R flank pain and fever, tmax 101 x 1 day. Nephrostomy tube no longer in place. On exam, febrile, R sided surgical site drain in place with scant serosanguinous drainage, +RCVAT, dry MM. Will administer IVF, pain control, anti-emetics, empiric abx, labwork including vbg with lactate, blood cultures, urinalysis, urine culture, CT A/P urology consultation and admission.

## 2017-08-13 NOTE — ED PROVIDER NOTE - OBJECTIVE STATEMENT
56 year old male with PMH of recent admission for right percutaneous nephrostolithomy for right kidney stone presents complaining of flank pain and high fever of 101.0 this morning. Patient was discharged 2 days, tube was removed, but he continued to drain fluid from the surgical site so a pouch was placed to collect the fluid. He has been experiencing low grade fever yesterday. No hematuria, dysuria, nausea or vomiting

## 2017-08-13 NOTE — H&P ADULT - NSHPLABSRESULTS_GEN_ALL_CORE
13.0   14.1  )-----------( 166      ( 13 Aug 2017 13:57 )             38.4       136  |  96  |  17  ----------------------------<  119<H>  4.0   |  27  |  1.40<H>    Ca    9.4      13 Aug 2017 13:57    TPro  7.4  /  Alb  4.4  /  TBili  1.1  /  DBili  x   /  AST  18  /  ALT  19  /  AlkPhos  60      136  |  96  |  17  ----------------------------<  119<H>  4.0   |  27  |  1.40<H>    Ca    9.4      13 Aug 2017 13:57    TPro  7.4  /  Alb  4.4  /  TBili  1.1  /  DBili  x   /  AST  18  /  ALT  19  /  AlkPhos  60      Urinalysis Basic - ( 13 Aug 2017 13:54 )    Color: Yellow / Appearance: Clear / S.017 / pH: x  Gluc: x / Ketone: Negative  / Bili: Negative / Urobili: Negative   Blood: x / Protein: 30 mg/dL / Nitrite: Negative   Leuk Esterase: Negative / RBC: 2-5 /HPF / WBC 0-2 /HPF   Sq Epi: x / Non Sq Epi: x / Bacteria: x

## 2017-08-13 NOTE — ED PROVIDER NOTE - MEDICAL DECISION MAKING DETAILS
56 year old male with recent admission for right percutaneous nephrostomy tube placement complaining of fever and sever flank pain. Get cbc, cmp, ua and culture, ct abdomen and pelvis with iv contrast, contact urology.

## 2017-08-13 NOTE — ED ADULT NURSE NOTE - OBJECTIVE STATEMENT
56y m pt s/p  right percutaneous nephrolithotomy c/o pain and fever; pt has pouch to drain from surgical site; pouch empty right now; wife reports brownish/red drainage this am; last time emptied was red again; aox3; no resp distress; no chest pain; no abd pain; pain to flank and back; surgical site clean; no redness; no swelling; pt warm and febrile; wife at bedside; iv placed; pt medicated

## 2017-08-13 NOTE — H&P ADULT - NSHPPHYSICALEXAM_GEN_ALL_CORE
Gen: NAD  Pulm: No respiratory distress, no subcostal retractions  CV: RRR, no JVD  Abd: Soft, ND, RLQ tenderness   Back: right cvat, pcnl incision pouched with scant pink urine   : Circumcised, no lesions.  No discharge or blood at urethral meatus.  Testes descended bilaterally.  Testes and epididymis nontender bilaterally.    MSK: No edema present

## 2017-08-13 NOTE — H&P ADULT - HISTORY OF PRESENT ILLNESS
55y/o male with PMHx kidney stones s/p Right PCNL on 8/10/17 uneventful post operative course. Pt presents to ED c/o right flank pain, fever of 101 and chills this morning. He denies dysuria, urgency, frequency, N/V or gross hematuria.

## 2017-08-13 NOTE — ED PROVIDER NOTE - CONSTITUTIONAL, MLM
normal... awake, alert, oriented to person, place, time/situation and in moderate distress due to pain

## 2017-08-14 LAB
ANION GAP SERPL CALC-SCNC: 11 MMOL/L — SIGNIFICANT CHANGE UP (ref 5–17)
BUN SERPL-MCNC: 19 MG/DL — SIGNIFICANT CHANGE UP (ref 7–23)
CALCIUM SERPL-MCNC: 8.5 MG/DL — SIGNIFICANT CHANGE UP (ref 8.4–10.5)
CHLORIDE SERPL-SCNC: 100 MMOL/L — SIGNIFICANT CHANGE UP (ref 96–108)
CO2 SERPL-SCNC: 26 MMOL/L — SIGNIFICANT CHANGE UP (ref 22–31)
CREAT SERPL-MCNC: 1.56 MG/DL — HIGH (ref 0.5–1.3)
CULTURE RESULTS: NO GROWTH — SIGNIFICANT CHANGE UP
GLUCOSE SERPL-MCNC: 125 MG/DL — HIGH (ref 70–99)
HCT VFR BLD CALC: 31.6 % — LOW (ref 39–50)
HGB BLD-MCNC: 10.7 G/DL — LOW (ref 13–17)
MCHC RBC-ENTMCNC: 31.1 PG — SIGNIFICANT CHANGE UP (ref 27–34)
MCHC RBC-ENTMCNC: 33.9 GM/DL — SIGNIFICANT CHANGE UP (ref 32–36)
MCV RBC AUTO: 91.6 FL — SIGNIFICANT CHANGE UP (ref 80–100)
PLATELET # BLD AUTO: 142 K/UL — LOW (ref 150–400)
POTASSIUM SERPL-MCNC: 4.4 MMOL/L — SIGNIFICANT CHANGE UP (ref 3.5–5.3)
POTASSIUM SERPL-SCNC: 4.4 MMOL/L — SIGNIFICANT CHANGE UP (ref 3.5–5.3)
RBC # BLD: 3.45 M/UL — LOW (ref 4.2–5.8)
RBC # FLD: 11.8 % — SIGNIFICANT CHANGE UP (ref 10.3–14.5)
SODIUM SERPL-SCNC: 137 MMOL/L — SIGNIFICANT CHANGE UP (ref 135–145)
SPECIMEN SOURCE: SIGNIFICANT CHANGE UP
SURGICAL PATHOLOGY STUDY: SIGNIFICANT CHANGE UP
WBC # BLD: 11.3 K/UL — HIGH (ref 3.8–10.5)
WBC # FLD AUTO: 11.3 K/UL — HIGH (ref 3.8–10.5)

## 2017-08-14 RX ORDER — SENNA PLUS 8.6 MG/1
2 TABLET ORAL AT BEDTIME
Qty: 0 | Refills: 0 | Status: DISCONTINUED | OUTPATIENT
Start: 2017-08-14 | End: 2017-08-16

## 2017-08-14 RX ORDER — SODIUM CHLORIDE 9 MG/ML
1000 INJECTION, SOLUTION INTRAVENOUS
Qty: 0 | Refills: 0 | Status: DISCONTINUED | OUTPATIENT
Start: 2017-08-14 | End: 2017-08-16

## 2017-08-14 RX ADMIN — OXYCODONE AND ACETAMINOPHEN 2 TABLET(S): 5; 325 TABLET ORAL at 10:15

## 2017-08-14 RX ADMIN — Medication 100 MILLIGRAM(S): at 21:38

## 2017-08-14 RX ADMIN — SENNA PLUS 2 TABLET(S): 8.6 TABLET ORAL at 21:38

## 2017-08-14 RX ADMIN — OXYCODONE AND ACETAMINOPHEN 2 TABLET(S): 5; 325 TABLET ORAL at 02:29

## 2017-08-14 RX ADMIN — OXYCODONE AND ACETAMINOPHEN 2 TABLET(S): 5; 325 TABLET ORAL at 10:45

## 2017-08-14 RX ADMIN — OXYCODONE AND ACETAMINOPHEN 1 TABLET(S): 5; 325 TABLET ORAL at 21:38

## 2017-08-14 RX ADMIN — HEPARIN SODIUM 5000 UNIT(S): 5000 INJECTION INTRAVENOUS; SUBCUTANEOUS at 05:41

## 2017-08-14 RX ADMIN — OXYCODONE AND ACETAMINOPHEN 2 TABLET(S): 5; 325 TABLET ORAL at 03:00

## 2017-08-14 RX ADMIN — HEPARIN SODIUM 5000 UNIT(S): 5000 INJECTION INTRAVENOUS; SUBCUTANEOUS at 17:07

## 2017-08-14 RX ADMIN — OXYCODONE AND ACETAMINOPHEN 2 TABLET(S): 5; 325 TABLET ORAL at 17:37

## 2017-08-14 RX ADMIN — OXYCODONE AND ACETAMINOPHEN 1 TABLET(S): 5; 325 TABLET ORAL at 22:00

## 2017-08-14 RX ADMIN — ONDANSETRON 4 MILLIGRAM(S): 8 TABLET, FILM COATED ORAL at 21:39

## 2017-08-14 RX ADMIN — Medication 100 MILLIGRAM(S): at 13:53

## 2017-08-14 RX ADMIN — Medication 100 MILLIGRAM(S): at 05:41

## 2017-08-14 RX ADMIN — CEFTRIAXONE 100 GRAM(S): 500 INJECTION, POWDER, FOR SOLUTION INTRAMUSCULAR; INTRAVENOUS at 13:53

## 2017-08-14 RX ADMIN — OXYCODONE AND ACETAMINOPHEN 2 TABLET(S): 5; 325 TABLET ORAL at 17:07

## 2017-08-14 NOTE — PROGRESS NOTE ADULT - SUBJECTIVE AND OBJECTIVE BOX
UROLOGY PA PROGRESS NOTE:     Subjective:  no acute events overnight. last fever 102.7 @1pm  complaining of rlq pain    Objective:  Vital signs  T(F): , Max: 102.7 (17 @ 13:00)  HR: 83 (17 @ 05:27)  BP: 107/70 (17 @ 05:27)  SpO2: 92% (17 @ 05:27)  Wt(kg): --    Output   voids- 725cc      Physical Exam:  Gen: NAD  Abd: soft, mildly distended, no ttp  : voiding    Labs:        11.3<H> / 31.6<L> /1.56<H>        14.1<H> / 38.4<L> /1.40<H>                          10.7   11.3  )-----------( 142      ( 14 Aug 2017 06:38 )             31.6         137  |  100  |  19  ----------------------------<  125<H>  4.4   |  26  |  1.56<H>    Ca    8.5      14 Aug 2017 06:38    TPro  7.4  /  Alb  4.4  /  TBili  1.1  /  DBili  x   /  AST  18  /  ALT  19  /  AlkPhos  60        Urinalysis Basic - ( 13 Aug 2017 13:54 )    Color: Yellow / Appearance: Clear / S.017 / pH: x  Gluc: x / Ketone: Negative  / Bili: Negative / Urobili: Negative   Blood: x / Protein: 30 mg/dL / Nitrite: Negative   Leuk Esterase: Negative / RBC: 2-5 /HPF / WBC 0-2 /HPF   Sq Epi: x / Non Sq Epi: x / Bacteria: x

## 2017-08-15 LAB
ANION GAP SERPL CALC-SCNC: 12 MMOL/L — SIGNIFICANT CHANGE UP (ref 5–17)
BUN SERPL-MCNC: 19 MG/DL — SIGNIFICANT CHANGE UP (ref 7–23)
CALCIUM SERPL-MCNC: 8.4 MG/DL — SIGNIFICANT CHANGE UP (ref 8.4–10.5)
CHLORIDE SERPL-SCNC: 99 MMOL/L — SIGNIFICANT CHANGE UP (ref 96–108)
CO2 SERPL-SCNC: 23 MMOL/L — SIGNIFICANT CHANGE UP (ref 22–31)
CREAT SERPL-MCNC: 1.6 MG/DL — HIGH (ref 0.5–1.3)
GLUCOSE SERPL-MCNC: 160 MG/DL — HIGH (ref 70–99)
HCT VFR BLD CALC: 28.5 % — LOW (ref 39–50)
HGB BLD-MCNC: 9.5 G/DL — LOW (ref 13–17)
MCHC RBC-ENTMCNC: 30.5 PG — SIGNIFICANT CHANGE UP (ref 27–34)
MCHC RBC-ENTMCNC: 33.4 GM/DL — SIGNIFICANT CHANGE UP (ref 32–36)
MCV RBC AUTO: 91.4 FL — SIGNIFICANT CHANGE UP (ref 80–100)
PLATELET # BLD AUTO: 151 K/UL — SIGNIFICANT CHANGE UP (ref 150–400)
POTASSIUM SERPL-MCNC: 3.7 MMOL/L — SIGNIFICANT CHANGE UP (ref 3.5–5.3)
POTASSIUM SERPL-SCNC: 3.7 MMOL/L — SIGNIFICANT CHANGE UP (ref 3.5–5.3)
RBC # BLD: 3.12 M/UL — LOW (ref 4.2–5.8)
RBC # FLD: 11.7 % — SIGNIFICANT CHANGE UP (ref 10.3–14.5)
SODIUM SERPL-SCNC: 134 MMOL/L — LOW (ref 135–145)
WBC # BLD: 9.3 K/UL — SIGNIFICANT CHANGE UP (ref 3.8–10.5)
WBC # FLD AUTO: 9.3 K/UL — SIGNIFICANT CHANGE UP (ref 3.8–10.5)

## 2017-08-15 RX ORDER — MULTIVIT WITH MIN/MFOLATE/K2 340-15/3 G
30 POWDER (GRAM) ORAL ONCE
Qty: 0 | Refills: 0 | Status: COMPLETED | OUTPATIENT
Start: 2017-08-15 | End: 2017-08-15

## 2017-08-15 RX ORDER — POLYETHYLENE GLYCOL 3350 17 G/17G
17 POWDER, FOR SOLUTION ORAL ONCE
Qty: 0 | Refills: 0 | Status: COMPLETED | OUTPATIENT
Start: 2017-08-15 | End: 2017-08-15

## 2017-08-15 RX ADMIN — Medication 30 MILLILITER(S): at 06:45

## 2017-08-15 RX ADMIN — OXYCODONE AND ACETAMINOPHEN 2 TABLET(S): 5; 325 TABLET ORAL at 21:31

## 2017-08-15 RX ADMIN — OXYCODONE AND ACETAMINOPHEN 2 TABLET(S): 5; 325 TABLET ORAL at 22:31

## 2017-08-15 RX ADMIN — OXYCODONE AND ACETAMINOPHEN 2 TABLET(S): 5; 325 TABLET ORAL at 04:15

## 2017-08-15 RX ADMIN — Medication 100 MILLIGRAM(S): at 05:47

## 2017-08-15 RX ADMIN — SENNA PLUS 2 TABLET(S): 8.6 TABLET ORAL at 21:31

## 2017-08-15 RX ADMIN — Medication 100 MILLIGRAM(S): at 21:31

## 2017-08-15 RX ADMIN — HEPARIN SODIUM 5000 UNIT(S): 5000 INJECTION INTRAVENOUS; SUBCUTANEOUS at 05:47

## 2017-08-15 RX ADMIN — CEFTRIAXONE 100 GRAM(S): 500 INJECTION, POWDER, FOR SOLUTION INTRAMUSCULAR; INTRAVENOUS at 13:26

## 2017-08-15 RX ADMIN — HEPARIN SODIUM 5000 UNIT(S): 5000 INJECTION INTRAVENOUS; SUBCUTANEOUS at 18:09

## 2017-08-15 RX ADMIN — Medication 100 MILLIGRAM(S): at 13:19

## 2017-08-15 RX ADMIN — POLYETHYLENE GLYCOL 3350 17 GRAM(S): 17 POWDER, FOR SOLUTION ORAL at 13:19

## 2017-08-15 RX ADMIN — OXYCODONE AND ACETAMINOPHEN 2 TABLET(S): 5; 325 TABLET ORAL at 11:14

## 2017-08-15 RX ADMIN — OXYCODONE AND ACETAMINOPHEN 2 TABLET(S): 5; 325 TABLET ORAL at 11:44

## 2017-08-15 RX ADMIN — OXYCODONE AND ACETAMINOPHEN 2 TABLET(S): 5; 325 TABLET ORAL at 03:47

## 2017-08-15 NOTE — PROGRESS NOTE ADULT - ASSESSMENT
57 yo male with fevers s/p R PCNL  - ucx- neg final  - f/u blood cxs  - cont abx  - f/u am labs  - mg citrate

## 2017-08-15 NOTE — PROGRESS NOTE ADULT - SUBJECTIVE AND OBJECTIVE BOX
UROLOGY PA PROGRESS NOTE:     Subjective: no acute events overnight . no BMs. mild abdominal discomfort      Objective:  Vital signs  T(F): , Max: 100.3 (17 @ 17:06)  HR: 81 (08-15-17 @ 05:34)  BP: 117/73 (08-15-17 @ 05:34)  SpO2: 93% (08-15-17 @ 05:34)  Wt(kg): --    Output     void- 1625cc    Physical Exam:  Gen: NAD  Abd: soft, mildly distended, mild lower abdominal TTP  : voiding    Labs:        11.3<H> / 31.6<L> /1.56<H>        14.1<H> / 38.4<L> /1.40<H>                          10.7   11.3  )-----------( 142      ( 14 Aug 2017 06:38 )             31.6         137  |  100  |  19  ----------------------------<  125<H>  4.4   |  26  |  1.56<H>    Ca    8.5      14 Aug 2017 06:38    TPro  7.4  /  Alb  4.4  /  TBili  1.1  /  DBili  x   /  AST  18  /  ALT  19  /  AlkPhos  60        Urinalysis Basic - ( 13 Aug 2017 13:54 )    Color: Yellow / Appearance: Clear / S.017 / pH: x  Gluc: x / Ketone: Negative  / Bili: Negative / Urobili: Negative   Blood: x / Protein: 30 mg/dL / Nitrite: Negative   Leuk Esterase: Negative / RBC: 2-5 /HPF / WBC 0-2 /HPF   Sq Epi: x / Non Sq Epi: x / Bacteria: x

## 2017-08-16 ENCOUNTER — TRANSCRIPTION ENCOUNTER (OUTPATIENT)
Age: 57
End: 2017-08-16

## 2017-08-16 VITALS
DIASTOLIC BLOOD PRESSURE: 75 MMHG | SYSTOLIC BLOOD PRESSURE: 132 MMHG | TEMPERATURE: 100 F | RESPIRATION RATE: 18 BRPM | OXYGEN SATURATION: 95 %

## 2017-08-16 DIAGNOSIS — N20.0 CALCULUS OF KIDNEY: ICD-10-CM

## 2017-08-16 LAB
ANION GAP SERPL CALC-SCNC: 9 MMOL/L — SIGNIFICANT CHANGE UP (ref 5–17)
BUN SERPL-MCNC: 14 MG/DL — SIGNIFICANT CHANGE UP (ref 7–23)
CALCIUM SERPL-MCNC: 8.7 MG/DL — SIGNIFICANT CHANGE UP (ref 8.4–10.5)
CHLORIDE SERPL-SCNC: 101 MMOL/L — SIGNIFICANT CHANGE UP (ref 96–108)
CO2 SERPL-SCNC: 29 MMOL/L — SIGNIFICANT CHANGE UP (ref 22–31)
COMPN STONE: SIGNIFICANT CHANGE UP
CREAT SERPL-MCNC: 1.08 MG/DL — SIGNIFICANT CHANGE UP (ref 0.5–1.3)
GLUCOSE SERPL-MCNC: 120 MG/DL — HIGH (ref 70–99)
HCT VFR BLD CALC: 29.8 % — LOW (ref 39–50)
HGB BLD-MCNC: 9.7 G/DL — LOW (ref 13–17)
MCHC RBC-ENTMCNC: 30 PG — SIGNIFICANT CHANGE UP (ref 27–34)
MCHC RBC-ENTMCNC: 32.5 GM/DL — SIGNIFICANT CHANGE UP (ref 32–36)
MCV RBC AUTO: 92.5 FL — SIGNIFICANT CHANGE UP (ref 80–100)
PLATELET # BLD AUTO: 179 K/UL — SIGNIFICANT CHANGE UP (ref 150–400)
POTASSIUM SERPL-MCNC: 4 MMOL/L — SIGNIFICANT CHANGE UP (ref 3.5–5.3)
POTASSIUM SERPL-SCNC: 4 MMOL/L — SIGNIFICANT CHANGE UP (ref 3.5–5.3)
RBC # BLD: 3.22 M/UL — LOW (ref 4.2–5.8)
RBC # FLD: 11.7 % — SIGNIFICANT CHANGE UP (ref 10.3–14.5)
SODIUM SERPL-SCNC: 139 MMOL/L — SIGNIFICANT CHANGE UP (ref 135–145)
WBC # BLD: 8 K/UL — SIGNIFICANT CHANGE UP (ref 3.8–10.5)
WBC # FLD AUTO: 8 K/UL — SIGNIFICANT CHANGE UP (ref 3.8–10.5)

## 2017-08-16 PROCEDURE — 85014 HEMATOCRIT: CPT

## 2017-08-16 PROCEDURE — 82803 BLOOD GASES ANY COMBINATION: CPT

## 2017-08-16 PROCEDURE — 96374 THER/PROPH/DIAG INJ IV PUSH: CPT | Mod: XU

## 2017-08-16 PROCEDURE — 74177 CT ABD & PELVIS W/CONTRAST: CPT

## 2017-08-16 PROCEDURE — 83605 ASSAY OF LACTIC ACID: CPT

## 2017-08-16 PROCEDURE — 99285 EMERGENCY DEPT VISIT HI MDM: CPT | Mod: 25

## 2017-08-16 PROCEDURE — 96376 TX/PRO/DX INJ SAME DRUG ADON: CPT

## 2017-08-16 PROCEDURE — 80048 BASIC METABOLIC PNL TOTAL CA: CPT

## 2017-08-16 PROCEDURE — 81001 URINALYSIS AUTO W/SCOPE: CPT

## 2017-08-16 PROCEDURE — 82947 ASSAY GLUCOSE BLOOD QUANT: CPT

## 2017-08-16 PROCEDURE — 87040 BLOOD CULTURE FOR BACTERIA: CPT

## 2017-08-16 PROCEDURE — 87086 URINE CULTURE/COLONY COUNT: CPT

## 2017-08-16 PROCEDURE — 84295 ASSAY OF SERUM SODIUM: CPT

## 2017-08-16 PROCEDURE — 80053 COMPREHEN METABOLIC PANEL: CPT

## 2017-08-16 PROCEDURE — 82330 ASSAY OF CALCIUM: CPT

## 2017-08-16 PROCEDURE — 96375 TX/PRO/DX INJ NEW DRUG ADDON: CPT | Mod: XU

## 2017-08-16 PROCEDURE — 82435 ASSAY OF BLOOD CHLORIDE: CPT

## 2017-08-16 PROCEDURE — 84132 ASSAY OF SERUM POTASSIUM: CPT

## 2017-08-16 PROCEDURE — 85027 COMPLETE CBC AUTOMATED: CPT

## 2017-08-16 RX ORDER — CEFDINIR 250 MG/5ML
1 POWDER, FOR SUSPENSION ORAL
Qty: 28 | Refills: 0 | OUTPATIENT
Start: 2017-08-16 | End: 2017-08-30

## 2017-08-16 RX ORDER — CEFDINIR 250 MG/5ML
1 POWDER, FOR SUSPENSION ORAL
Qty: 14 | Refills: 0 | OUTPATIENT
Start: 2017-08-16 | End: 2017-08-23

## 2017-08-16 RX ORDER — POLYETHYLENE GLYCOL 3350 17 G/17G
17 POWDER, FOR SOLUTION ORAL
Qty: 68 | Refills: 0 | OUTPATIENT
Start: 2017-08-16 | End: 2017-08-20

## 2017-08-16 RX ADMIN — HEPARIN SODIUM 5000 UNIT(S): 5000 INJECTION INTRAVENOUS; SUBCUTANEOUS at 05:51

## 2017-08-16 RX ADMIN — OXYCODONE AND ACETAMINOPHEN 1 TABLET(S): 5; 325 TABLET ORAL at 05:54

## 2017-08-16 RX ADMIN — SODIUM CHLORIDE 100 MILLILITER(S): 9 INJECTION, SOLUTION INTRAVENOUS at 05:56

## 2017-08-16 RX ADMIN — OXYCODONE AND ACETAMINOPHEN 1 TABLET(S): 5; 325 TABLET ORAL at 06:55

## 2017-08-16 RX ADMIN — Medication 100 MILLIGRAM(S): at 05:50

## 2017-08-16 NOTE — DISCHARGE NOTE ADULT - CARE PROVIDER_API CALL
Tamera Stafford), Urology  [address] [place] [address]  Phone: [phone]  Fax: [phone]

## 2017-08-16 NOTE — DISCHARGE NOTE ADULT - CONDITIONS AT DISCHARGE
Patient a&ox4. Patient VSs. Patient safety maintained. Patient IV removed with no signs/symptoms of redness/swelling.

## 2017-08-16 NOTE — DISCHARGE NOTE ADULT - PATIENT PORTAL LINK FT
“You can access the FollowHealth Patient Portal, offered by [facility], by registering with the following website: [email or web address]”

## 2017-08-16 NOTE — PROGRESS NOTE ADULT - SUBJECTIVE AND OBJECTIVE BOX
UROLOGY DAILY PROGRESS NOTE:     Subjective: Patient seen and examined at bedside.       Objective:  Vital signs  T(F): , Max: 99.6 (08-16-17 @ 04:34)  HR: 84 (08-16-17 @ 04:34)  BP: 106/65 (08-16-17 @ 04:34)  SpO2: 97% (08-16-17 @ 04:34)  Wt(kg): --    Output     I&O's Detail    14 Aug 2017 07:01  -  15 Aug 2017 07:00  --------------------------------------------------------  IN:    dextrose 5% + sodium chloride 0.45%.: 2400 mL    Oral Fluid: 1890 mL  Total IN: 4290 mL    OUT:    Voided: 2625 mL  Total OUT: 2625 mL    Total NET: 1665 mL      15 Aug 2017 07:01  -  16 Aug 2017 06:34  --------------------------------------------------------  IN:    dextrose 5% + sodium chloride 0.45%.: 1200 mL    IV PiggyBack: 100 mL    Oral Fluid: 1560 mL  Total IN: 2860 mL    OUT:    Voided: 3850 mL  Total OUT: 3850 mL    Total NET: -990 mL          Physical Exam:  Gen: NAD  Abd: soft NTND  Back: mild CVAT     Labs:    08-15    9.3   / 28.5<L> /1.60<H>    08-14    11.3<H> / 31.6<L> /1.56<H>                          9.5    9.3   )-----------( 151      ( 15 Aug 2017 06:29 )             28.5     08-15    134<L>  |  99  |  19  ----------------------------<  160<H>  3.7   |  23  |  1.60<H>    Ca    8.4      15 Aug 2017 06:29            Urine Cx: UROLOGY DAILY PROGRESS NOTE:     Subjective: Patient seen and examined at bedside. No acute issues overnight       Objective:  Vital signs  T(F): , Max: 99.6 (08-16-17 @ 04:34)  HR: 84 (08-16-17 @ 04:34)  BP: 106/65 (08-16-17 @ 04:34)  SpO2: 97% (08-16-17 @ 04:34)  Wt(kg): --    Output     I&O's Detail    14 Aug 2017 07:01  -  15 Aug 2017 07:00  --------------------------------------------------------  IN:    dextrose 5% + sodium chloride 0.45%.: 2400 mL    Oral Fluid: 1890 mL  Total IN: 4290 mL    OUT:    Voided: 2625 mL  Total OUT: 2625 mL    Total NET: 1665 mL      15 Aug 2017 07:01  -  16 Aug 2017 06:34  --------------------------------------------------------  IN:    dextrose 5% + sodium chloride 0.45%.: 1200 mL    IV PiggyBack: 100 mL    Oral Fluid: 1560 mL  Total IN: 2860 mL    OUT:    Voided: 3850 mL  Total OUT: 3850 mL    Total NET: -990 mL          Physical Exam:  Gen: NAD  Abd: soft NTND  Back: mild CVAT     Labs:    08-15    9.3   / 28.5<L> /1.60<H>    08-14    11.3<H> / 31.6<L> /1.56<H>                          9.5    9.3   )-----------( 151      ( 15 Aug 2017 06:29 )             28.5     08-15    134<L>  |  99  |  19  ----------------------------<  160<H>  3.7   |  23  |  1.60<H>    Ca    8.4      15 Aug 2017 06:29            Urine Cx:

## 2017-08-16 NOTE — DISCHARGE NOTE ADULT - HOSPITAL COURSE
55y/o male with PMHx kidney stones s/p Right PCNL on 8/10/17 uneventful post operative course. Pt presents to ED c/o right flank pain, fever of 101 and chills this morning. He denies dysuria, urgency, frequency, N/V or gross hematuria.    Ct scan reveals R renal hemorrhage urinoma. Blood and urin e 57y/o male with PMHx kidney stones s/p Right PCNL on 8/10/17 uneventful post operative course. Pt presents to ED c/o right flank pain, fever of 101 and chills this morning. He denies dysuria, urgency, frequency, N/V or gross hematuria.    Ct scan reveals R renal subcapsular hematoma and urinoma. Blood and urine cultures sent no growth. IV antibiotics administered   Upon discharge Afebrile, pain well managed, voiding tolerating diet  Complete 7 days cefdinir

## 2017-08-16 NOTE — PROGRESS NOTE ADULT - ASSESSMENT
57 yo male with fevers s/p R PCNL  - ucx- neg final  - f/u blood cxs  - cont abx  - f/u am labs  dc planning with [employer]

## 2017-08-16 NOTE — DISCHARGE NOTE ADULT - NS AS ACTIVITY OBS
No Heavy lifting/straining/Do not drive or operate machinery/Walking-Indoors allowed/Showering allowed/Walking-Outdoors allowed

## 2017-08-16 NOTE — DISCHARGE NOTE ADULT - PLAN OF CARE
resolution of infection complete antibiotics course  call office ER fever>101  pain not relieved by oral medications  unable to tolerate diet  bleeding  unable to void resolution of stones maintain adequate hydration

## 2017-08-16 NOTE — DISCHARGE NOTE ADULT - INSTRUCTIONS
If unable to tolerate diet nausea, vomiting, fever above 100.3, chills, or an increase in pain, notify provider or return to ER.

## 2017-08-16 NOTE — DISCHARGE NOTE ADULT - CARE PLAN
Principal Discharge DX:	Fever, unspecified fever cause  Goal:	resolution of infection  Instructions for follow-up, activity and diet:	complete antibiotics course  call office ER fever>101  pain not relieved by oral medications  unable to tolerate diet  bleeding  unable to void  Secondary Diagnosis:	Renal stones  Goal:	resolution of stones  Instructions for follow-up, activity and diet:	maintain adequate hydration

## 2017-08-16 NOTE — DISCHARGE NOTE ADULT - MEDICATION SUMMARY - MEDICATIONS TO TAKE
I will START or STAY ON the medications listed below when I get home from the hospital:    acetaminophen-oxycodone 325 mg-5 mg oral tablet  -- 1-2  tab(s) by mouth every 4-6 hours, As Needed MDD:8  -- Caution federal law prohibits the transfer of this drug to any person other  than the person for whom it was prescribed.  May cause drowsiness.  Alcohol may intensify this effect.  Use care when operating dangerous machinery.  This prescription cannot be refilled.  This product contains acetaminophen.  Do not use  with any other product containing acetaminophen to prevent possible liver damage.  Using more of this medication than prescribed may cause serious breathing problems.    -- Indication: For pain relief    Percocet 5/325 325 mg-5 mg oral tablet  -- 2 tab(s) by mouth every 6 hours, As Needed -for moderate pain MDD:8  -- Caution federal law prohibits the transfer of this drug to any person other  than the person for whom it was prescribed.  May cause drowsiness.  Alcohol may intensify this effect.  Use care when operating dangerous machinery.  This prescription cannot be refilled.  This product contains acetaminophen.  Do not use  with any other product containing acetaminophen to prevent possible liver damage.  Using more of this medication than prescribed may cause serious breathing problems.    -- Indication: For pain relief    cefdinir 300 mg oral capsule  -- 1 cap(s) by mouth every 12 hours  -- Finish all this medication unless otherwise directed by prescriber.    -- Indication: For infection

## 2017-08-18 LAB
CULTURE RESULTS: SIGNIFICANT CHANGE UP
CULTURE RESULTS: SIGNIFICANT CHANGE UP
SPECIMEN SOURCE: SIGNIFICANT CHANGE UP
SPECIMEN SOURCE: SIGNIFICANT CHANGE UP

## 2017-08-28 ENCOUNTER — APPOINTMENT (OUTPATIENT)
Dept: UROLOGY | Facility: CLINIC | Age: 57
End: 2017-08-28
Payer: COMMERCIAL

## 2017-08-28 PROCEDURE — 99024 POSTOP FOLLOW-UP VISIT: CPT

## 2017-10-15 ENCOUNTER — FORM ENCOUNTER (OUTPATIENT)
Age: 57
End: 2017-10-15

## 2017-10-15 NOTE — DISCHARGE NOTE ADULT - NSCORESITESY/N_GEN_A_CORE_RD
No Nutrition consult received for assessment. 78y/o F with medical history of CHF, DM, HTN, breast cancer, multiple surgical history noted. Patient admitted 2/2 hypercalcemia. Currently on NPO, awaiting speech and swallow evaluation. Per son, pt was consuming mechanical soft diet prior to admission . She reports likes vanilla ice-cream, rice pudding, applesauce. Patient was also eating sandwiches PTA. Noted she is edentulous. Over 1 year pt has lost significant weight due to physiological condition. Usual body weight noted 240lbs, weight at last admission 188lbs (RD note 8/22/17) and her current weight further reduced to 161.8lbs. +1 generalized edema noted.

## 2017-10-16 ENCOUNTER — APPOINTMENT (OUTPATIENT)
Dept: MRI IMAGING | Facility: IMAGING CENTER | Age: 57
End: 2017-10-16
Payer: COMMERCIAL

## 2017-10-16 ENCOUNTER — OUTPATIENT (OUTPATIENT)
Dept: OUTPATIENT SERVICES | Facility: HOSPITAL | Age: 57
LOS: 1 days | End: 2017-10-16
Payer: COMMERCIAL

## 2017-10-16 DIAGNOSIS — N40.2 NODULAR PROSTATE WITHOUT LOWER URINARY TRACT SYMPTOMS: ICD-10-CM

## 2017-10-16 DIAGNOSIS — Z98.890 OTHER SPECIFIED POSTPROCEDURAL STATES: Chronic | ICD-10-CM

## 2017-10-16 DIAGNOSIS — R97.20 ELEVATED PROSTATE SPECIFIC ANTIGEN [PSA]: ICD-10-CM

## 2017-10-16 PROCEDURE — 72197 MRI PELVIS W/O & W/DYE: CPT

## 2017-10-16 PROCEDURE — A9585: CPT

## 2017-10-16 PROCEDURE — 72197 MRI PELVIS W/O & W/DYE: CPT | Mod: 26

## 2017-11-10 ENCOUNTER — APPOINTMENT (OUTPATIENT)
Dept: DERMATOLOGY | Facility: CLINIC | Age: 57
End: 2017-11-10
Payer: COMMERCIAL

## 2017-11-10 PROCEDURE — 99213 OFFICE O/P EST LOW 20 MIN: CPT | Mod: 25

## 2017-11-10 PROCEDURE — 17000 DESTRUCT PREMALG LESION: CPT

## 2018-01-12 ENCOUNTER — APPOINTMENT (OUTPATIENT)
Dept: UROLOGY | Facility: CLINIC | Age: 58
End: 2018-01-12
Payer: COMMERCIAL

## 2018-01-12 ENCOUNTER — OUTPATIENT (OUTPATIENT)
Dept: OUTPATIENT SERVICES | Facility: HOSPITAL | Age: 58
LOS: 1 days | End: 2018-01-12
Payer: COMMERCIAL

## 2018-01-12 DIAGNOSIS — R35.0 FREQUENCY OF MICTURITION: ICD-10-CM

## 2018-01-12 DIAGNOSIS — N40.2 NODULAR PROSTATE W/OUT LOWER URINARY TRACT SYMPTOMS: ICD-10-CM

## 2018-01-12 DIAGNOSIS — Z98.890 OTHER SPECIFIED POSTPROCEDURAL STATES: Chronic | ICD-10-CM

## 2018-01-12 PROCEDURE — 76775 US EXAM ABDO BACK WALL LIM: CPT

## 2018-01-12 PROCEDURE — 76775 US EXAM ABDO BACK WALL LIM: CPT | Mod: 26

## 2018-01-12 PROCEDURE — 99213 OFFICE O/P EST LOW 20 MIN: CPT | Mod: 25

## 2018-01-18 DIAGNOSIS — N20.0 CALCULUS OF KIDNEY: ICD-10-CM

## 2018-01-18 DIAGNOSIS — N40.2 NODULAR PROSTATE WITHOUT LOWER URINARY TRACT SYMPTOMS: ICD-10-CM

## 2018-01-18 LAB — PSA SERPL-MCNC: 5.5 NG/ML

## 2018-02-21 ENCOUNTER — APPOINTMENT (OUTPATIENT)
Dept: UROLOGY | Facility: CLINIC | Age: 58
End: 2018-02-21
Payer: COMMERCIAL

## 2018-02-21 VITALS
WEIGHT: 180 LBS | BODY MASS INDEX: 25.77 KG/M2 | SYSTOLIC BLOOD PRESSURE: 148 MMHG | RESPIRATION RATE: 17 BRPM | TEMPERATURE: 97.8 F | HEART RATE: 67 BPM | DIASTOLIC BLOOD PRESSURE: 91 MMHG | HEIGHT: 70 IN

## 2018-02-21 PROCEDURE — 99213 OFFICE O/P EST LOW 20 MIN: CPT

## 2018-02-22 LAB — PSA SERPL-MCNC: 3.09 NG/ML

## 2018-02-27 ENCOUNTER — MEDICATION RENEWAL (OUTPATIENT)
Age: 58
End: 2018-02-27

## 2018-03-01 NOTE — PATIENT PROFILE ADULT. - FUNCTIONAL SCREEN CURRENT LEVEL: SWALLOWING (IF SCORE 2 OR MORE FOR ANY ITEM, CONSULT REHAB SERVICES), MLM)
Discussed with patient and granddaughter.  She is on no memory medications and would hesitate to initiate them because of potential side effects.  Fall precautions discussed.  The patient is to be in a supervised environment at home.  Continue citalopram which has helped his behavior.  Also advised daughter that she would have significant difficulty making financial decisions or decisions regarding her medical care.  
(0) swallows foods/liquids without difficulty

## 2018-05-25 ENCOUNTER — APPOINTMENT (OUTPATIENT)
Dept: UROLOGY | Facility: CLINIC | Age: 58
End: 2018-05-25

## 2018-07-11 LAB — PSA SERPL-MCNC: 2.79 NG/ML

## 2018-07-16 PROBLEM — N20.0 CALCULUS OF KIDNEY: Chronic | Status: ACTIVE | Noted: 2017-08-01

## 2018-07-18 ENCOUNTER — APPOINTMENT (OUTPATIENT)
Dept: UROLOGY | Facility: CLINIC | Age: 58
End: 2018-07-18
Payer: COMMERCIAL

## 2018-07-18 VITALS
BODY MASS INDEX: 24.91 KG/M2 | HEIGHT: 70 IN | HEART RATE: 72 BPM | SYSTOLIC BLOOD PRESSURE: 145 MMHG | TEMPERATURE: 98.1 F | WEIGHT: 174 LBS | RESPIRATION RATE: 17 BRPM | DIASTOLIC BLOOD PRESSURE: 81 MMHG

## 2018-07-18 PROBLEM — N40.0 BENIGN PROSTATIC HYPERPLASIA WITHOUT LOWER URINARY TRACT SYMPTOMS: Chronic | Status: ACTIVE | Noted: 2017-08-01

## 2018-07-18 PROBLEM — I34.1 NONRHEUMATIC MITRAL (VALVE) PROLAPSE: Chronic | Status: ACTIVE | Noted: 2017-08-01

## 2018-07-18 PROCEDURE — 99213 OFFICE O/P EST LOW 20 MIN: CPT

## 2018-11-15 ENCOUNTER — APPOINTMENT (OUTPATIENT)
Dept: DERMATOLOGY | Facility: CLINIC | Age: 58
End: 2018-11-15
Payer: COMMERCIAL

## 2018-11-15 PROCEDURE — 99213 OFFICE O/P EST LOW 20 MIN: CPT

## 2019-01-31 LAB — PSA SERPL-MCNC: 3.21 NG/ML

## 2019-02-04 ENCOUNTER — APPOINTMENT (OUTPATIENT)
Dept: UROLOGY | Facility: CLINIC | Age: 59
End: 2019-02-04
Payer: COMMERCIAL

## 2019-02-04 ENCOUNTER — OUTPATIENT (OUTPATIENT)
Dept: OUTPATIENT SERVICES | Facility: HOSPITAL | Age: 59
LOS: 1 days | End: 2019-02-04
Payer: COMMERCIAL

## 2019-02-04 VITALS
DIASTOLIC BLOOD PRESSURE: 77 MMHG | HEART RATE: 84 BPM | WEIGHT: 170 LBS | SYSTOLIC BLOOD PRESSURE: 120 MMHG | HEIGHT: 70 IN | RESPIRATION RATE: 17 BRPM | TEMPERATURE: 98.5 F | BODY MASS INDEX: 24.34 KG/M2

## 2019-02-04 DIAGNOSIS — R35.0 FREQUENCY OF MICTURITION: ICD-10-CM

## 2019-02-04 DIAGNOSIS — Z98.890 OTHER SPECIFIED POSTPROCEDURAL STATES: Chronic | ICD-10-CM

## 2019-02-04 PROCEDURE — 76775 US EXAM ABDO BACK WALL LIM: CPT

## 2019-02-04 PROCEDURE — 99213 OFFICE O/P EST LOW 20 MIN: CPT | Mod: 25

## 2019-02-04 PROCEDURE — 76775 US EXAM ABDO BACK WALL LIM: CPT | Mod: 26

## 2019-02-04 RX ORDER — SODIUM BICARBONATE 650 MG/1
650 TABLET ORAL
Qty: 120 | Refills: 3 | Status: ACTIVE | COMMUNITY
Start: 2019-02-04 | End: 1900-01-01

## 2019-02-04 NOTE — ASSESSMENT
[FreeTextEntry1] : LL from 01/19/19 shows low pH level.\par He is not taking K Citrate because the pills are difficult to swallow and he developed GERD from the liquid form of K Citrate.\par We will start him on Sodium bicarbonate.\par LL in 5 months and RTO in 6 months for results and US.

## 2019-02-04 NOTE — ADDENDUM
[FreeTextEntry1] :  I, Argenis Turner, acted solely as a scribe for Dr. Ye French. The documentation recorded by the scribe accurately reflects the service I personally performed and the decision by me.\par

## 2019-02-04 NOTE — HISTORY OF PRESENT ILLNESS
[FreeTextEntry1] : 58 year old male presents for evaluation of test results.\par Pt c/o a very mild discomfort in his groin area.\par US today reviewed: He has a 2 cm cyst in lower pole of left kidney, but no stones, hydronephrosis, or solid masses present.\par His PSA is now 3.21 on 01/30/19, previously 5.5 in Jan 2018. \par His PSA levels has fluctuated within the last year with unclear etiology. \par LL from 01/19/19 shows low pH level.\par He is not taking K Citrate because the pills are difficult to swallow and he developed GERD from the liquid form of K Citrate.\par We will start him on Sodium bicarbonate.\par LL in 5 months and RTO in 6 months for results and US.

## 2019-02-04 NOTE — PHYSICAL EXAM
[General Appearance - Well Developed] : well developed [General Appearance - Well Nourished] : well nourished [Normal Appearance] : normal appearance [Well Groomed] : well groomed [General Appearance - In No Acute Distress] : no acute distress [Abdomen Soft] : soft [Abdomen Tenderness] : non-tender [Costovertebral Angle Tenderness] : no ~M costovertebral angle tenderness [Urethral Meatus] : meatus normal [Urinary Bladder Findings] : the bladder was normal on palpation [Scrotum] : the scrotum was normal [Testes Mass (___cm)] : there were no testicular masses [Rectal Exam - Rectum] : digital rectal exam was normal [No Prostate Nodules] : no prostate nodules [Prostate Size ___ gm] : prostate size [unfilled] gm [Edema] : no peripheral edema [] : no respiratory distress [Respiration, Rhythm And Depth] : normal respiratory rhythm and effort [Exaggerated Use Of Accessory Muscles For Inspiration] : no accessory muscle use [Oriented To Time, Place, And Person] : oriented to person, place, and time [Affect] : the affect was normal [Mood] : the mood was normal [Not Anxious] : not anxious [Normal Station and Gait] : the gait and station were normal for the patient's age [No Focal Deficits] : no focal deficits [No Palpable Adenopathy] : no palpable adenopathy

## 2019-02-06 DIAGNOSIS — R97.20 ELEVATED PROSTATE SPECIFIC ANTIGEN [PSA]: ICD-10-CM

## 2019-02-06 DIAGNOSIS — N20.0 CALCULUS OF KIDNEY: ICD-10-CM

## 2019-04-05 ENCOUNTER — TRANSCRIPTION ENCOUNTER (OUTPATIENT)
Age: 59
End: 2019-04-05

## 2019-08-05 ENCOUNTER — APPOINTMENT (OUTPATIENT)
Dept: UROLOGY | Facility: CLINIC | Age: 59
End: 2019-08-05

## 2019-11-29 ENCOUNTER — APPOINTMENT (OUTPATIENT)
Dept: DERMATOLOGY | Facility: CLINIC | Age: 59
End: 2019-11-29
Payer: COMMERCIAL

## 2019-11-29 PROCEDURE — 99214 OFFICE O/P EST MOD 30 MIN: CPT

## 2020-02-06 LAB
PSA FREE FLD-MCNC: 30 %
PSA FREE SERPL-MCNC: 1.08 NG/ML
PSA SERPL-MCNC: 3.6 NG/ML

## 2020-02-10 ENCOUNTER — APPOINTMENT (OUTPATIENT)
Dept: UROLOGY | Facility: CLINIC | Age: 60
End: 2020-02-10
Payer: COMMERCIAL

## 2020-02-10 ENCOUNTER — OUTPATIENT (OUTPATIENT)
Dept: OUTPATIENT SERVICES | Facility: HOSPITAL | Age: 60
LOS: 1 days | End: 2020-02-10
Payer: COMMERCIAL

## 2020-02-10 DIAGNOSIS — R35.0 FREQUENCY OF MICTURITION: ICD-10-CM

## 2020-02-10 DIAGNOSIS — N20.0 CALCULUS OF KIDNEY: ICD-10-CM

## 2020-02-10 DIAGNOSIS — Z98.890 OTHER SPECIFIED POSTPROCEDURAL STATES: Chronic | ICD-10-CM

## 2020-02-10 PROCEDURE — 76775 US EXAM ABDO BACK WALL LIM: CPT | Mod: 26

## 2020-02-10 PROCEDURE — 99213 OFFICE O/P EST LOW 20 MIN: CPT | Mod: 25

## 2020-02-10 PROCEDURE — 76775 US EXAM ABDO BACK WALL LIM: CPT

## 2020-02-10 NOTE — PHYSICAL EXAM
[General Appearance - Well Nourished] : well nourished [Normal Appearance] : normal appearance [General Appearance - Well Developed] : well developed [Well Groomed] : well groomed [Abdomen Soft] : soft [General Appearance - In No Acute Distress] : no acute distress [Abdomen Tenderness] : non-tender [Costovertebral Angle Tenderness] : no ~M costovertebral angle tenderness [Urethral Meatus] : meatus normal [Testes Mass (___cm)] : there were no testicular masses [Urinary Bladder Findings] : the bladder was normal on palpation [Scrotum] : the scrotum was normal [Rectal Exam - Rectum] : digital rectal exam was normal [No Prostate Nodules] : no prostate nodules [] : no rash [Edema] : no peripheral edema [Oriented To Time, Place, And Person] : oriented to person, place, and time [Respiration, Rhythm And Depth] : normal respiratory rhythm and effort [Exaggerated Use Of Accessory Muscles For Inspiration] : no accessory muscle use [Mood] : the mood was normal [Affect] : the affect was normal [Not Anxious] : not anxious [No Focal Deficits] : no focal deficits [Normal Station and Gait] : the gait and station were normal for the patient's age [No Palpable Adenopathy] : no palpable adenopathy [Prostate Size ___ gm] : prostate size [unfilled] gm [FreeTextEntry1] : smooth and regular

## 2020-02-10 NOTE — HISTORY OF PRESENT ILLNESS
[FreeTextEntry1] : 59 year old male presents for evaluation of test results.\par PSA was 3.6 on 01/30/2020\par US renal today again visualized a 2.0 cm x 1.6 cm exophytic irregular septated nonvascular cyst in the lower pole of the left kidney and a 1.6 cm x 1.6 cm simple cyst in the mid pole. Both kidneys are normal in size and echogenicity without stones, hydronephrosis or solid masses visualized. \par LL from 01/30/2020 shows low output, SS CaOx, high Ca, low urinary pH and SS UA. Protein intake is also high\par Pt was advised to increase fluid intake. \par Prescribed K Citrate \par Advised to reduce protein intake. \par LL in 5 months and RTO in 6 months for results and US \par

## 2020-02-10 NOTE — ASSESSMENT
[FreeTextEntry1] : PSA was 3.6 on 01/30/2020\par US renal today again visualized a 2.0 cm x 1.6 cm exophytic irregular septated nonvascular cyst in the lower pole of the left kidney and a 1.6 cm x 1.6 cm simple cyst in the mid pole. Both kidneys are normal in size and echogenicity without stones, hydronephrosis or solid masses visualized. \par LL from 01/30/2020 shows low output, SS CaOx, high Ca, low urinary pH and SS UA. Protein intake is also high\par Pt was advised to increase fluid intake. \par Prescribed K Citrate \par Advised to reduce protein intake. \par LL in 5 months and RTO in 6 months for results and US \par \par

## 2020-02-10 NOTE — ADDENDUM
[FreeTextEntry1] :  I, Kelsie Pacheco, acted solely as a scribe for Dr. Ye French. The documentation recorded by the scribe accurately reflects the service I personally performed and the decision by me.\par

## 2020-02-23 DIAGNOSIS — N20.0 CALCULUS OF KIDNEY: ICD-10-CM

## 2020-07-21 ENCOUNTER — APPOINTMENT (OUTPATIENT)
Dept: UROLOGY | Facility: CLINIC | Age: 60
End: 2020-07-21
Payer: COMMERCIAL

## 2020-07-21 PROCEDURE — 99441: CPT

## 2020-07-23 LAB — PSA SERPL-MCNC: 3.29 NG/ML

## 2020-08-06 ENCOUNTER — APPOINTMENT (OUTPATIENT)
Dept: UROLOGY | Facility: CLINIC | Age: 60
End: 2020-08-06

## 2020-11-27 ENCOUNTER — APPOINTMENT (OUTPATIENT)
Dept: DERMATOLOGY | Facility: CLINIC | Age: 60
End: 2020-11-27
Payer: COMMERCIAL

## 2020-11-27 ENCOUNTER — RESULT REVIEW (OUTPATIENT)
Age: 60
End: 2020-11-27

## 2020-11-27 PROCEDURE — 99214 OFFICE O/P EST MOD 30 MIN: CPT | Mod: 25

## 2020-11-27 PROCEDURE — 11102 TANGNTL BX SKIN SINGLE LES: CPT

## 2021-01-20 ENCOUNTER — APPOINTMENT (OUTPATIENT)
Dept: DERMATOLOGY | Facility: CLINIC | Age: 61
End: 2021-01-20
Payer: COMMERCIAL

## 2021-01-20 PROCEDURE — 17000 DESTRUCT PREMALG LESION: CPT | Mod: 59

## 2021-01-20 PROCEDURE — 17110 DESTRUCTION B9 LES UP TO 14: CPT

## 2021-01-20 PROCEDURE — 99213 OFFICE O/P EST LOW 20 MIN: CPT | Mod: 25

## 2021-01-20 PROCEDURE — 99072 ADDL SUPL MATRL&STAF TM PHE: CPT

## 2021-03-12 LAB — PSA SERPL-MCNC: 12 NG/ML

## 2021-03-17 ENCOUNTER — APPOINTMENT (OUTPATIENT)
Dept: UROLOGY | Facility: CLINIC | Age: 61
End: 2021-03-17
Payer: COMMERCIAL

## 2021-03-17 ENCOUNTER — OUTPATIENT (OUTPATIENT)
Dept: OUTPATIENT SERVICES | Facility: HOSPITAL | Age: 61
LOS: 1 days | End: 2021-03-17
Payer: COMMERCIAL

## 2021-03-17 DIAGNOSIS — R35.0 FREQUENCY OF MICTURITION: ICD-10-CM

## 2021-03-17 DIAGNOSIS — Z98.890 OTHER SPECIFIED POSTPROCEDURAL STATES: Chronic | ICD-10-CM

## 2021-03-17 DIAGNOSIS — R97.20 ELEVATED PROSTATE SPECIFIC ANTIGEN [PSA]: ICD-10-CM

## 2021-03-17 PROCEDURE — 76775 US EXAM ABDO BACK WALL LIM: CPT | Mod: 26

## 2021-03-17 PROCEDURE — 76775 US EXAM ABDO BACK WALL LIM: CPT

## 2021-03-17 PROCEDURE — 99213 OFFICE O/P EST LOW 20 MIN: CPT

## 2021-03-17 PROCEDURE — 99072 ADDL SUPL MATRL&STAF TM PHE: CPT

## 2021-03-17 NOTE — HISTORY OF PRESENT ILLNESS
[FreeTextEntry1] : Pt.has renal US today\par Right kidney: 10.5 x 5.6 x 5.4 cm \par Cortical Thickness: up 1.5 cm lp 1.5 cm \par \par Left kidney: 11.1 x 6.0 x 5.2 cm			 \par Cortical Thickness: up 1.5 cm lp 1.6 cm \par  \par Echogenicity: Normal\par \par Bladder: Not visualized \par \par Findings: Again visualized 2 non vascular cysts in the left kidney 1.8 x 1.6 c exophytic lower pole cyst with septation and mid 2.0 x 1.8 x 1.9 cm. Both kidneys are normal in size and echogenicity without obvious stones, hydronephrosis or solid masses visualized. \par \par Routine PSA found to be 12\par Nocturia 0-2\par Stream not so strong \par Some intermittency \par After dribble at times \par No dysuria\par Occasional ri t iliac fossa discomfort

## 2021-03-17 NOTE — PHYSICAL EXAM
[Penis Abnormality] : normal circumcised penis [Scrotum] : the scrotum was normal [Rectal Exam - Seminal Vesicles] : the seminal vesicles were normal [Epididymis] : the epididymides were normal [Testes Tenderness] : no tenderness of the testes [Testes Mass (___cm)] : there were no testicular masses [Anus Abnormality] : the anus and perineum were normal [Rectal Exam - Rectum] : digital rectal exam was normal [Prostate Tenderness] : the prostate was not tender [No Prostate Nodules] : no prostate nodules [Prostate Size ___ gm] : prostate size [unfilled] gm

## 2021-03-17 NOTE — ASSESSMENT
[FreeTextEntry1] : Findings: Again visualized 2 non vascular cysts in the left kidney 1.8 x 1.6 c exophytic lower pole cyst with septation and mid 2.0 x 1.8 x 1.9 cm. Both kidneys are normal in size and echogenicity without obvious stones, hydronephrosis or solid masses visualized. \par \par Routine PSA found to be 12 previously 3\par Nocturia 0-2\par Stream not so strong \par Some intermittency \par After dribble at times \par No dysuria\par Occasional ri t iliac fossa discomfort \par Suggest MRI

## 2021-03-24 ENCOUNTER — OUTPATIENT (OUTPATIENT)
Dept: OUTPATIENT SERVICES | Facility: HOSPITAL | Age: 61
LOS: 1 days | End: 2021-03-24
Payer: COMMERCIAL

## 2021-03-24 ENCOUNTER — RESULT REVIEW (OUTPATIENT)
Age: 61
End: 2021-03-24

## 2021-03-24 ENCOUNTER — APPOINTMENT (OUTPATIENT)
Dept: MRI IMAGING | Facility: CLINIC | Age: 61
End: 2021-03-24
Payer: COMMERCIAL

## 2021-03-24 DIAGNOSIS — N40.2 NODULAR PROSTATE WITHOUT LOWER URINARY TRACT SYMPTOMS: ICD-10-CM

## 2021-03-24 DIAGNOSIS — Z00.8 ENCOUNTER FOR OTHER GENERAL EXAMINATION: ICD-10-CM

## 2021-03-24 DIAGNOSIS — Z98.890 OTHER SPECIFIED POSTPROCEDURAL STATES: Chronic | ICD-10-CM

## 2021-03-24 PROCEDURE — 76377 3D RENDER W/INTRP POSTPROCES: CPT

## 2021-03-24 PROCEDURE — 72197 MRI PELVIS W/O & W/DYE: CPT | Mod: 26

## 2021-03-24 PROCEDURE — A9585: CPT

## 2021-03-24 PROCEDURE — 72197 MRI PELVIS W/O & W/DYE: CPT

## 2021-03-24 PROCEDURE — 76377 3D RENDER W/INTRP POSTPROCES: CPT | Mod: 26

## 2021-03-30 ENCOUNTER — APPOINTMENT (OUTPATIENT)
Dept: UROLOGY | Facility: CLINIC | Age: 61
End: 2021-03-30
Payer: COMMERCIAL

## 2021-03-30 PROCEDURE — 99072 ADDL SUPL MATRL&STAF TM PHE: CPT

## 2021-03-30 PROCEDURE — 99213 OFFICE O/P EST LOW 20 MIN: CPT

## 2021-03-30 NOTE — ADDENDUM
[FreeTextEntry1] : Entered by Ronaldo Barajas, acting as scribe for Dr. French\par \par The documentation recorded by the scribe accurately reflects the service I personally performed and the decisions made by me.\par

## 2021-03-30 NOTE — ASSESSMENT
[FreeTextEntry1] : The patient returns today for follow-up for elevated PSA and history of nephrolithiasis.\par \par He has a family history of prostate cancer.\par \par The patient recently underwent a prostate MRI.\par I personally reviewed MRI prostate images with the patient today and images demonstrated a 100 gram prostate gland, PI-RADS 1. His PSA density was 0.26 ng/mL/cc.\par \par His recent PSA was 24.9.\par I believe his elevated PSA may be acute prostatitis. I recommended the patient begin a course of Ciprofloxacin.\par \par His 24-Hour urinalysis demonstrated elevated sodium and a high concentration of uric acid. His urine pH was 5.2.\par Patient notes that he has not been taking Potassium citrate as directed. I encouraged the patient to take Potassium citrate regularly as directed.\par \par I recommended he obtain PSA, urinalysis and urine culture today. I advised the patient to call me on Friday for his results.\par \par I have spent 15 minutes of time on the encounter

## 2021-03-30 NOTE — HISTORY OF PRESENT ILLNESS
[FreeTextEntry1] : The patient returns today for follow-up for elevated PSA and history of nephrolithiasis.\par \par He has a family history of prostate cancer.\par \par The patient recently underwent a prostate MRI.\par I personally reviewed MRI prostate images with the patient today and images demonstrated a 100 gram prostate gland, PI-RADS 1. His PSA density was 0.26 ng/mL/cc.\par \par His recent PSA was 24.9.\par I believe his elevated PSA may be acute prostatitis. I recommended the patient begin a course of Ciprofloxacin.\par \par His 24-Hour urinalysis demonstrated elevated sodium and a high concentration of uric acid. His urine pH was 5.2.\par Patient notes that he has not been taking Potassium citrate as directed. I encouraged the patient to take Potassium citrate regularly as directed.\par \par I recommended he obtain PSA, urinalysis and urine culture today. I advised the patient to call me on Friday for his results.

## 2021-03-31 LAB
APPEARANCE: CLEAR
BACTERIA: NEGATIVE
BILIRUBIN URINE: NEGATIVE
BLOOD URINE: NEGATIVE
COLOR: NORMAL
GLUCOSE QUALITATIVE U: NEGATIVE
HYALINE CASTS: 0 /LPF
KETONES URINE: NEGATIVE
LEUKOCYTE ESTERASE URINE: NEGATIVE
MICROSCOPIC-UA: NORMAL
NITRITE URINE: NEGATIVE
PH URINE: 6
PROTEIN URINE: NORMAL
PSA SERPL-MCNC: 14.9 NG/ML
PSA SERPL-MCNC: 24.9 NG/ML
RED BLOOD CELLS URINE: 0 /HPF
SPECIFIC GRAVITY URINE: 1.02
SQUAMOUS EPITHELIAL CELLS: 0 /HPF
UROBILINOGEN URINE: NORMAL
WHITE BLOOD CELLS URINE: 0 /HPF

## 2021-04-01 LAB — BACTERIA UR CULT: NORMAL

## 2021-04-22 LAB — PSA SERPL-MCNC: 11.3 NG/ML

## 2021-04-23 ENCOUNTER — APPOINTMENT (OUTPATIENT)
Dept: UROLOGY | Facility: CLINIC | Age: 61
End: 2021-04-23
Payer: COMMERCIAL

## 2021-04-23 PROCEDURE — 99212 OFFICE O/P EST SF 10 MIN: CPT

## 2021-04-23 PROCEDURE — 99072 ADDL SUPL MATRL&STAF TM PHE: CPT

## 2021-04-23 NOTE — ASSESSMENT
[FreeTextEntry1] : The patient is a 60 year old presenting today for a follow up for an elevated PSA. \par His PSA on 4/21/21 was 11.3, which was down from his previous PSA on 3/30/21, which was 14.9.\par \par The patient will go for a blood test to measure his PSA before the next appointment. \par \par The patient should follow up in 2 months to repeat his PSA and follow up to review the results.\par \par I spent 15 minutes reviewing the lab results and advising the patient.

## 2021-04-23 NOTE — HISTORY OF PRESENT ILLNESS
[FreeTextEntry1] : The patient is a 60 year old presenting today for a follow up for an elevated PSA. \par His PSA on 4/21/21 was 11.3, which was down from his previous PSA on 3/30/21, which was 14.9.\par \par The patient will go for a blood test to measure his PSA before the next appointment. \par \par The patient should follow up in 2 months to repeat his PSA and follow up to review the results.

## 2021-06-22 LAB — PSA SERPL-MCNC: 19.8 NG/ML

## 2021-06-23 ENCOUNTER — APPOINTMENT (OUTPATIENT)
Dept: UROLOGY | Facility: CLINIC | Age: 61
End: 2021-06-23
Payer: COMMERCIAL

## 2021-06-23 VITALS
SYSTOLIC BLOOD PRESSURE: 118 MMHG | HEART RATE: 65 BPM | DIASTOLIC BLOOD PRESSURE: 73 MMHG | TEMPERATURE: 98.3 F | HEIGHT: 60 IN | WEIGHT: 177 LBS | BODY MASS INDEX: 34.75 KG/M2 | RESPIRATION RATE: 17 BRPM

## 2021-06-23 PROCEDURE — 99212 OFFICE O/P EST SF 10 MIN: CPT

## 2021-06-23 NOTE — ASSESSMENT
[FreeTextEntry1] : The patient is a 60 year old male presenting today for a follow up visit for elevated PSA. \par His most recent PSA from 6/21/21 was 19.8. This was increased from his last PSA of 11.3 on 4/21/21. \par His prostate recently increased in size from 67cc to 100cc. \par He has nocturia 0-1 times per night.\par The patient has intermittency \par He denies feelings of incomplete emptying and dysuria.\par The patient is not bothered by his symptoms and has not sought out medications to improve his urination. \par The patient has a past history of nephrolithiasis. \par He inquired about PTH and the effects it could have on his prostate.\par \par The MRI of the prostate from 3/24/21 showed:\par No MRI targetable prostate lesions.\par *PIRADS 1 - Very low (clinically significant cancer is highly unlikely to be present)\par Marked nodular hyperplasia of the transition zone for an overall gland volume of 100 cc. PSA density: 0.26 ng/mL/cc\par \par Blood work sent for the next appointment included prostate Ca screen, PSA total, BMP, PTH serum and serum uric acid.\par \par I am prescribing Sulfamethoxazole-Trimethoprim, 800-160mg one tablet twice daily for one month. I advised the patient that this should address the inflammation of his prostate and bring down his PSA. \par I advised him that checking PTH is indicated by high serum calcium. Because his LL showed higher levels of calcium than normal, I will test his serum calcium on his next blood work. \par I also advised the patient that he needs to decrease his salt intake. \par \par The patient will return to the office in one month for repeat blood work and a follow up visit. \par \par I spent 15 minutes with the patient.

## 2021-06-23 NOTE — PHYSICAL EXAM
[General Appearance - Well Developed] : well developed [General Appearance - Well Nourished] : well nourished [Normal Appearance] : normal appearance [Well Groomed] : well groomed [General Appearance - In No Acute Distress] : no acute distress [Abdomen Soft] : soft [Abdomen Tenderness] : non-tender [Edema] : no peripheral edema [] : no respiratory distress [Respiration, Rhythm And Depth] : normal respiratory rhythm and effort [Exaggerated Use Of Accessory Muscles For Inspiration] : no accessory muscle use [Oriented To Time, Place, And Person] : oriented to person, place, and time [Mood] : the mood was normal [Affect] : the affect was normal [Not Anxious] : not anxious [Normal Station and Gait] : the gait and station were normal for the patient's age [No Focal Deficits] : no focal deficits [No Palpable Adenopathy] : no palpable adenopathy

## 2021-06-23 NOTE — HISTORY OF PRESENT ILLNESS
[FreeTextEntry1] : The patient is a 60 year old male presenting today for a follow up visit for elevated PSA. \par His most recent PSA from 6/21/21 was 19.8. This was increased from his last PSA of 11.3 on 4/21/21. \par His prostate recently increased in size from 67cc to 100cc. \par He has nocturia 0-1 times per night.\par The patient has intermittency \par He denies feelings of incomplete emptying and dysuria.\par The patient is not bothered by his symptoms and has not sought out medications to improve his urination. \par The patient has a past history of nephrolithiasis. \par He inquired about PTH and the effects it could have on his prostate.\par \par The MRI of the prostate from 3/24/21 showed:\par No MRI targetable prostate lesions.\par *PIRADS 1 - Very low (clinically significant cancer is highly unlikely to be present)\par Marked nodular hyperplasia of the transition zone for an overall gland volume of 100 cc. PSA density: 0.26 ng/mL/cc\par \par Blood work sent for the next appointment included prostate Ca screen, PSA total, BMP, PTH serum and serum uric acid.\par \par I am prescribing Sulfamethoxazole-Trimethoprim, 800-160mg one tablet twice daily for one month. I advised the patient that this should address the inflammation of his prostate and bring down his PSA. \par I advised him that checking PTH is indicated by high serum calcium. Because his LL showed higher levels of calcium than normal, I will test his serum calcium on his next blood work. \par I also advised the patient that he needs to decrease his salt intake. \par \par The patient will return to the office in one month for repeat blood work and a follow up visit.

## 2021-07-26 LAB
ANION GAP SERPL CALC-SCNC: 13 MMOL/L
BUN SERPL-MCNC: 18 MG/DL
CALCIUM SERPL-MCNC: 9.6 MG/DL
CALCIUM SERPL-MCNC: 9.6 MG/DL
CHLORIDE SERPL-SCNC: 104 MMOL/L
CO2 SERPL-SCNC: 22 MMOL/L
CREAT SERPL-MCNC: 1.18 MG/DL
GLUCOSE SERPL-MCNC: 115 MG/DL
PARATHYROID HORMONE INTACT: 20 PG/ML
POTASSIUM SERPL-SCNC: 4.4 MMOL/L
PSA SERPL-MCNC: 9.58 NG/ML
SODIUM SERPL-SCNC: 138 MMOL/L
URATE SERPL-MCNC: 6 MG/DL

## 2021-07-27 ENCOUNTER — APPOINTMENT (OUTPATIENT)
Dept: UROLOGY | Facility: CLINIC | Age: 61
End: 2021-07-27
Payer: COMMERCIAL

## 2021-07-27 PROCEDURE — 51798 US URINE CAPACITY MEASURE: CPT

## 2021-07-27 PROCEDURE — 99212 OFFICE O/P EST SF 10 MIN: CPT

## 2021-07-27 NOTE — ASSESSMENT
[FreeTextEntry1] : The patient is a 60 year old male presenting today for elevated PSA.\par His PSA from 7/22/21 was 9.58, which is decreased from his PSA on 6/21/21 which was 19.8.\par He reports he has frequent urination with nocturia 3 times per night. \par The symptoms have gotten slightly worse since his last visit.\par He does note feelings of incomplete emptying. \par \par His PVR was 97 cc. \par \par I am prescribing Tamsulosin 0.4 mg, one capsule at night. I advised him of the potential side effects of lightheadedness and retrograde ejaculation. \par I advised him to cut back on fluid intake before bed to decrease the nocturia. \par \par On the Friday before his next appointment, he will come in for blood work for prostate Ca screen. \par He should come to the office with a full bladder so he can complete a uroflow study. \par \par The patient will return to the office in 6 weeks. \par \par I spent 15 minutes with the patient.

## 2021-07-27 NOTE — HISTORY OF PRESENT ILLNESS
[FreeTextEntry1] : The patient is a 60 year old male presenting today for elevated PSA.\par His PSA from 7/22/21 was 9.58, which is decreased from his PSA on 6/21/21 which was 19.8.\par He reports he has frequent urination with nocturia 3 times per night. \par The symptoms have gotten slightly worse since his last visit.\par He does note feelings of incomplete emptying. \par \par His PVR was 97 cc. \par \par I am prescribing Tamsulosin 0.4 mg, one capsule at night. I advised him of the potential side effects of lightheadedness and retrograde ejaculation. \par I advised him to cut back on fluid intake before bed to decrease the nocturia. \par \par On the Friday before his next appointment, he will come in for blood work for prostate Ca screen. \par He should come to the office with a full bladder so he can complete a uroflow study. \par \par The patient will return to the office in 6 weeks.

## 2021-09-09 LAB — PSA SERPL-MCNC: 13.1 NG/ML

## 2021-09-14 ENCOUNTER — APPOINTMENT (OUTPATIENT)
Dept: UROLOGY | Facility: CLINIC | Age: 61
End: 2021-09-14
Payer: COMMERCIAL

## 2021-09-14 PROCEDURE — 99212 OFFICE O/P EST SF 10 MIN: CPT

## 2021-09-14 PROCEDURE — 51798 US URINE CAPACITY MEASURE: CPT

## 2021-09-14 NOTE — ASSESSMENT
[FreeTextEntry1] : The patient is a 60 year old male presenting today for elevated PSA. \par His last PSA on 9/8/21 was 13.1 ng/mL. This was elevated from his previous PSA of 9.58 on 7/22/21. \par He has nocturia 1 time per night, which is consistent from before he began taking Tamsulosin.\par He currently takes Tamsulosin 0.4 mg, one capsule at night. \par \par His MRI prostate from 3/24/21 showed a 100 cc prostate gland with a PSA density of 0.26 ng/mL/cc. \par \par His uroflow study showed a peak flow of 11.2 mL/s and an average flow rate of 5.6 mL/s. His total voided volume was 314 mL. \par His PVR was 157 cc.\par \par I recommend he has a tissue biopsy with Dr. Arnold. \par \par The patient will return to the office for the biopsy. \par \par I spent 15 minutes with the patient.

## 2021-09-14 NOTE — HISTORY OF PRESENT ILLNESS
[FreeTextEntry1] : The patient is a 60 year old male presenting today for elevated PSA. \par His last PSA on 9/8/21 was 13.1 ng/mL. This was elevated from his previous PSA of 9.58 on 7/22/21. \par He has nocturia 1 time per night, which is consistent from before he began taking Tamsulosin.\par He currently takes Tamsulosin 0.4 mg, one capsule at night. \par \par His MRI prostate from 3/24/21 showed a 100 cc prostate gland with a PSA density of 0.26 ng/mL/cc. \par \par His uroflow study showed a peak flow of 11.2 mL/s and an average flow rate of 5.6 mL/s. His total voided volume was 314 mL. \par His PVR was 157 cc.\par \par I recommend he has a tissue biopsy with Dr. Arnold. \par \par The patient will return to the office for the biopsy.

## 2021-09-29 ENCOUNTER — APPOINTMENT (OUTPATIENT)
Dept: UROLOGY | Facility: CLINIC | Age: 61
End: 2021-09-29
Payer: COMMERCIAL

## 2021-09-29 ENCOUNTER — OUTPATIENT (OUTPATIENT)
Dept: OUTPATIENT SERVICES | Facility: HOSPITAL | Age: 61
LOS: 1 days | End: 2021-09-29
Payer: COMMERCIAL

## 2021-09-29 VITALS
RESPIRATION RATE: 17 BRPM | DIASTOLIC BLOOD PRESSURE: 81 MMHG | OXYGEN SATURATION: 96 % | SYSTOLIC BLOOD PRESSURE: 131 MMHG | HEART RATE: 66 BPM | TEMPERATURE: 98.1 F | BODY MASS INDEX: 34.57 KG/M2 | WEIGHT: 177 LBS

## 2021-09-29 VITALS — SYSTOLIC BLOOD PRESSURE: 124 MMHG | DIASTOLIC BLOOD PRESSURE: 75 MMHG | HEART RATE: 74 BPM

## 2021-09-29 DIAGNOSIS — R35.0 FREQUENCY OF MICTURITION: ICD-10-CM

## 2021-09-29 DIAGNOSIS — Z98.890 OTHER SPECIFIED POSTPROCEDURAL STATES: Chronic | ICD-10-CM

## 2021-09-29 PROCEDURE — 76872 US TRANSRECTAL: CPT | Mod: 26

## 2021-09-29 PROCEDURE — 55700: CPT

## 2021-09-29 PROCEDURE — 76942 ECHO GUIDE FOR BIOPSY: CPT | Mod: 26,59

## 2021-09-29 PROCEDURE — 76872 US TRANSRECTAL: CPT

## 2021-09-29 PROCEDURE — 76942 ECHO GUIDE FOR BIOPSY: CPT | Mod: 59

## 2021-09-30 DIAGNOSIS — R97.20 ELEVATED PROSTATE SPECIFIC ANTIGEN [PSA]: ICD-10-CM

## 2021-11-17 LAB — CORE LAB BIOPSY: NORMAL

## 2021-11-29 ENCOUNTER — APPOINTMENT (OUTPATIENT)
Dept: DERMATOLOGY | Facility: CLINIC | Age: 61
End: 2021-11-29
Payer: COMMERCIAL

## 2021-11-29 PROCEDURE — 99213 OFFICE O/P EST LOW 20 MIN: CPT

## 2022-03-28 ENCOUNTER — LABORATORY RESULT (OUTPATIENT)
Age: 62
End: 2022-03-28

## 2022-04-05 ENCOUNTER — APPOINTMENT (OUTPATIENT)
Dept: UROLOGY | Facility: CLINIC | Age: 62
End: 2022-04-05
Payer: COMMERCIAL

## 2022-04-05 PROCEDURE — 99213 OFFICE O/P EST LOW 20 MIN: CPT

## 2022-04-05 RX ORDER — POTASSIUM CITRATE AND CITRIC ACID 334; 1100 MG/5ML; MG/5ML
1100-334 SOLUTION ORAL
Qty: 2700 | Refills: 3 | Status: DISCONTINUED | COMMUNITY
Start: 2018-02-21 | End: 2022-04-05

## 2022-04-05 RX ORDER — CIPROFLOXACIN HYDROCHLORIDE 500 MG/1
500 TABLET, FILM COATED ORAL TWICE DAILY
Qty: 10 | Refills: 0 | Status: DISCONTINUED | COMMUNITY
Start: 2021-03-30 | End: 2022-04-05

## 2022-04-05 RX ORDER — TAMSULOSIN HYDROCHLORIDE 0.4 MG/1
0.4 CAPSULE ORAL
Qty: 90 | Refills: 3 | Status: DISCONTINUED | COMMUNITY
Start: 2021-07-27 | End: 2022-04-05

## 2022-04-05 RX ORDER — SULFAMETHOXAZOLE AND TRIMETHOPRIM 800; 160 MG/1; MG/1
800-160 TABLET ORAL TWICE DAILY
Qty: 60 | Refills: 0 | Status: DISCONTINUED | COMMUNITY
Start: 2021-06-23 | End: 2022-04-05

## 2022-04-05 NOTE — HISTORY OF PRESENT ILLNESS
[FreeTextEntry1] : The patient is a 60 year old male presenting today for elevated PSA. \par His last PSA on 9/8/21 was 13.1 ng/mL. This was elevated from his previous PSA of 9.58 on 7/22/21. \par He has nocturia 1 time per night, which is consistent from before he began taking Tamsulosin.\par He currently takes Tamsulosin 0.4 mg, one capsule at night. \par \par His MRI prostate from 3/24/21 showed a 100 cc prostate gland with a PSA density of 0.26 ng/mL/cc. \par had negative biopsy 9/21\par latest PSA 9.5 \par His uroflow study showed a peak flow of 11.2 mL/s. His total voided volume was 314 mL. His PVR was 157 cc.\par \par now off tamsulosin - voids Ok; can have intermittency for time to time and has variable nocturia 0-1. FOS Ok and no urgency. \par H/o PSE in past - had negative ULS 3/21. \par \par

## 2022-04-05 NOTE — ASSESSMENT
[FreeTextEntry1] : PSA back down - PSA now less than 0.1 - will observe\par No issues with LUTS nor kidney stones so no plans for imaging

## 2022-09-29 ENCOUNTER — LABORATORY RESULT (OUTPATIENT)
Age: 62
End: 2022-09-29

## 2022-10-05 ENCOUNTER — APPOINTMENT (OUTPATIENT)
Dept: UROLOGY | Facility: CLINIC | Age: 62
End: 2022-10-05

## 2022-10-05 VITALS
OXYGEN SATURATION: 98 % | RESPIRATION RATE: 17 BRPM | DIASTOLIC BLOOD PRESSURE: 75 MMHG | HEIGHT: 70 IN | HEART RATE: 66 BPM | SYSTOLIC BLOOD PRESSURE: 120 MMHG | BODY MASS INDEX: 23.62 KG/M2 | WEIGHT: 165 LBS

## 2022-10-05 DIAGNOSIS — R97.20 ELEVATED PROSTATE, SPECIFIC ANTIGEN [PSA]: ICD-10-CM

## 2022-10-05 PROCEDURE — 99212 OFFICE O/P EST SF 10 MIN: CPT

## 2022-10-05 NOTE — HISTORY OF PRESENT ILLNESS
[FreeTextEntry1] : The patient is a 60 year old male presenting today for elevated PSA. \par His last PSA on 9/8/21 was 13.1 ng/mL. This was elevated from his previous PSA of 9.58 on 7/22/21. \par He has nocturia 1 time per night, which is consistent from before he began taking Tamsulosin.\par He currently takes Tamsulosin 0.4 mg, one capsule at night. \par \par His MRI prostate from 3/24/21 showed a 100 cc prostate gland with a PSA density of 0.26 ng/mL/cc. \par had negative biopsy 9/21\par latest PSA 9.5 \par His uroflow study showed a peak flow of 11.2 mL/s. His total voided volume was 314 mL. His PVR was 157 cc.\par \par now off tamsulosin - voids Ok; can have intermittency for time to time and has variable nocturia 0-1. FOS Ok and no urgency. \par H/o PSE in past - had negative ULS 3/21. \par \par 10/22 - no significant voiding issues.\par PSA down to 8.8 \par \par

## 2022-11-28 ENCOUNTER — APPOINTMENT (OUTPATIENT)
Dept: DERMATOLOGY | Facility: CLINIC | Age: 62
End: 2022-11-28

## 2022-11-28 PROCEDURE — 99214 OFFICE O/P EST MOD 30 MIN: CPT | Mod: 25

## 2022-11-28 PROCEDURE — 17000 DESTRUCT PREMALG LESION: CPT | Mod: 59

## 2022-11-28 PROCEDURE — 17110 DESTRUCTION B9 LES UP TO 14: CPT

## 2023-09-27 ENCOUNTER — LABORATORY RESULT (OUTPATIENT)
Age: 63
End: 2023-09-27

## 2023-11-07 ENCOUNTER — APPOINTMENT (OUTPATIENT)
Dept: UROLOGY | Facility: CLINIC | Age: 63
End: 2023-11-07
Payer: COMMERCIAL

## 2023-11-07 VITALS — DIASTOLIC BLOOD PRESSURE: 76 MMHG | OXYGEN SATURATION: 98 % | HEART RATE: 63 BPM | SYSTOLIC BLOOD PRESSURE: 123 MMHG

## 2023-11-07 DIAGNOSIS — R97.20 ELEVATED PROSTATE, SPECIFIC ANTIGEN [PSA]: ICD-10-CM

## 2023-11-07 PROCEDURE — 99212 OFFICE O/P EST SF 10 MIN: CPT

## 2023-11-08 ENCOUNTER — APPOINTMENT (OUTPATIENT)
Dept: UROLOGY | Facility: CLINIC | Age: 63
End: 2023-11-08

## 2024-01-09 ENCOUNTER — APPOINTMENT (OUTPATIENT)
Dept: DERMATOLOGY | Facility: CLINIC | Age: 64
End: 2024-01-09
Payer: MEDICARE

## 2024-01-09 PROCEDURE — 99213 OFFICE O/P EST LOW 20 MIN: CPT

## 2024-06-06 NOTE — H&P ADULT - PMH
BPH (benign prostatic hyperplasia)    MVP (mitral valve prolapse)  "mild leaky valve i was told"  Renal stones  2015 & now Increased Nutrient Needs...

## 2024-10-28 ENCOUNTER — LABORATORY RESULT (OUTPATIENT)
Age: 64
End: 2024-10-28

## 2024-10-30 ENCOUNTER — APPOINTMENT (OUTPATIENT)
Dept: UROLOGY | Facility: CLINIC | Age: 64
End: 2024-10-30
Payer: MEDICARE

## 2024-10-30 VITALS
RESPIRATION RATE: 17 BRPM | BODY MASS INDEX: 25.05 KG/M2 | SYSTOLIC BLOOD PRESSURE: 102 MMHG | WEIGHT: 175 LBS | TEMPERATURE: 98.2 F | DIASTOLIC BLOOD PRESSURE: 64 MMHG | HEART RATE: 74 BPM | HEIGHT: 70 IN

## 2024-10-30 DIAGNOSIS — N52.9 MALE ERECTILE DYSFUNCTION, UNSPECIFIED: ICD-10-CM

## 2024-10-30 DIAGNOSIS — R97.20 ELEVATED PROSTATE, SPECIFIC ANTIGEN [PSA]: ICD-10-CM

## 2024-10-30 PROCEDURE — G2211 COMPLEX E/M VISIT ADD ON: CPT | Mod: NC

## 2024-10-30 PROCEDURE — 99203 OFFICE O/P NEW LOW 30 MIN: CPT

## 2024-10-30 RX ORDER — SILDENAFIL 100 MG/1
100 TABLET, FILM COATED ORAL
Qty: 30 | Refills: 3 | Status: ACTIVE | COMMUNITY
Start: 2024-10-30 | End: 1900-01-01

## 2024-12-05 NOTE — PATIENT PROFILE ADULT. - PRO INTERPRETER NEED 2
Sandra called rx line at 1200 stating pended med is .  OK for refill?   Next OV 25. Thanks, CG   
English

## 2025-01-16 ENCOUNTER — APPOINTMENT (OUTPATIENT)
Dept: DERMATOLOGY | Facility: CLINIC | Age: 65
End: 2025-01-16
Payer: MEDICARE

## 2025-01-16 PROCEDURE — 99213 OFFICE O/P EST LOW 20 MIN: CPT
